# Patient Record
Sex: FEMALE | Race: WHITE
[De-identification: names, ages, dates, MRNs, and addresses within clinical notes are randomized per-mention and may not be internally consistent; named-entity substitution may affect disease eponyms.]

---

## 2017-01-19 ENCOUNTER — HOSPITAL ENCOUNTER (OUTPATIENT)
Dept: HOSPITAL 83 - LAB | Age: 62
Discharge: HOME | End: 2017-01-19
Attending: INTERNAL MEDICINE
Payer: MEDICARE

## 2017-01-19 DIAGNOSIS — D89.9: ICD-10-CM

## 2017-01-19 DIAGNOSIS — D63.1: ICD-10-CM

## 2017-01-19 DIAGNOSIS — E83.40: ICD-10-CM

## 2017-01-19 DIAGNOSIS — I15.1: Primary | ICD-10-CM

## 2017-01-19 DIAGNOSIS — Z94.0: ICD-10-CM

## 2017-01-19 DIAGNOSIS — T86.19: ICD-10-CM

## 2017-01-19 LAB
ALBUMIN SERPL-MCNC: 3.6 GM/DL (ref 3.1–4.5)
ALP SERPL-CCNC: 97 U/L (ref 45–117)
ALT SERPL W P-5'-P-CCNC: 27 U/L (ref 12–78)
AST SERPL-CCNC: 20 IU/L (ref 3–35)
BASOPHILS # BLD AUTO: 0 10*3/UL (ref 0–0.1)
BASOPHILS NFR BLD AUTO: 0.5 % (ref 0–1)
BUN SERPL-MCNC: 19 MG/DL (ref 7–24)
CHLORIDE SERPL-SCNC: 109 MMOL/L (ref 98–107)
CHOLEST SERPL-MCNC: 152 MG/DL (ref ?–200)
CK SERPL-CCNC: 65 U/L (ref 26–192)
CO2 SERPL-SCNC: 29 MMOL/L (ref 21–32)
EOSINOPHIL # BLD AUTO: 0.2 10*3/UL (ref 0–0.4)
EOSINOPHIL # BLD AUTO: 2.7 % (ref 1–4)
ERYTHROCYTE [DISTWIDTH] IN BLOOD BY AUTOMATED COUNT: 12.8 % (ref 0–14.5)
GLUCOSE SERPL-MCNC: 116 MG/DL (ref 65–99)
HCT VFR BLD AUTO: 43.9 % (ref 37–47)
HGB BLD-MCNC: 14.4 G/DL (ref 12–16)
IG #: 0 10*3/UL (ref 0–0.1)
LDH SERPL-CCNC: 151 U/L (ref 84–246)
LYMPHOCYTES # BLD AUTO: 0.9 10*3/UL (ref 1.3–4.4)
LYMPHOCYTES NFR BLD AUTO: 14.2 % (ref 27–41)
MAGNESIUM SERPL-MCNC: 2 MG/DL (ref 1.5–2.1)
MCH RBC QN AUTO: 28.9 PG (ref 27–31)
MCHC RBC AUTO-ENTMCNC: 32.8 G/DL (ref 33–37)
MCV RBC AUTO: 88.2 FL (ref 81–99)
MONOCYTES # BLD AUTO: 0.6 10*3/UL (ref 0.1–1)
MONOCYTES NFR BLD MANUAL: 9.7 % (ref 3–9)
NEUT #: 4.5 10*3/UL (ref 2.3–7.9)
NEUT %: 72.4 % (ref 47–73)
NRBC BLD QL AUTO: 0 10*3/UL (ref 0–0)
PHOSPHATE SERPL-MCNC: 2.2 MG/DL (ref 2.5–4.9)
PLATELET # BLD AUTO: 144 10*3/UL (ref 130–400)
PMV BLD AUTO: 12 FL (ref 9.6–12.3)
POTASSIUM SERPL-SCNC: 4.1 MMOL/L (ref 3.5–5.1)
PROT SERPL-MCNC: 6.4 GM/DL (ref 6.4–8.2)
RBC # BLD AUTO: 4.98 10*6/UL (ref 4.1–5.1)
SODIUM SERPL-SCNC: 145 MMOL/L (ref 136–145)
URATE SERPL-MCNC: 5 MG/DL (ref 2.6–6)
WBC NRBC COR # BLD AUTO: 6.2 10*3/UL (ref 4.8–10.8)

## 2017-01-29 ENCOUNTER — HOSPITAL ENCOUNTER (EMERGENCY)
Dept: HOSPITAL 83 - ED | Age: 62
Discharge: HOME | End: 2017-01-29
Payer: MEDICARE

## 2017-01-29 VITALS — HEIGHT: 55 IN
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
## 2017-01-29 DIAGNOSIS — S01.81XA: Primary | ICD-10-CM

## 2017-01-29 DIAGNOSIS — Z88.6: ICD-10-CM

## 2017-01-29 DIAGNOSIS — S66.912A: ICD-10-CM

## 2017-01-29 DIAGNOSIS — Z88.8: ICD-10-CM

## 2017-01-29 DIAGNOSIS — S80.01XA: ICD-10-CM

## 2017-01-29 DIAGNOSIS — Z79.01: ICD-10-CM

## 2017-01-29 DIAGNOSIS — S66.911A: ICD-10-CM
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
## 2017-01-29 DIAGNOSIS — Z79.4: ICD-10-CM
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
## 2017-01-29 DIAGNOSIS — Z90.49: ICD-10-CM
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
## 2017-01-29 LAB
ALBUMIN SERPL-MCNC: 3.4 GM/DL (ref 3.1–4.5)
ALP SERPL-CCNC: 91 U/L (ref 45–117)
ALT SERPL W P-5'-P-CCNC: 22 U/L (ref 12–78)
AST SERPL-CCNC: 17 IU/L (ref 3–35)
BASOPHILS # BLD AUTO: 0 10*3/UL (ref 0–0.1)
BASOPHILS NFR BLD AUTO: 0.6 % (ref 0–1)
BUN SERPL-MCNC: 18 MG/DL (ref 7–24)
CHLORIDE SERPL-SCNC: 108 MMOL/L (ref 98–107)
CO2 SERPL-SCNC: 25 MMOL/L (ref 21–32)
EOSINOPHIL # BLD AUTO: 0.2 10*3/UL (ref 0–0.4)
EOSINOPHIL # BLD AUTO: 3.3 % (ref 1–4)
ERYTHROCYTE [DISTWIDTH] IN BLOOD BY AUTOMATED COUNT: 12.8 % (ref 0–14.5)
GLUCOSE SERPL-MCNC: 139 MG/DL (ref 65–99)
HCT VFR BLD AUTO: 42.6 % (ref 37–47)
HGB BLD-MCNC: 13.9 G/DL (ref 12–16)
IG #: 0 10*3/UL (ref 0–0.1)
INR BLD: 2.3 (ref 2–3.5)
LYMPHOCYTES # BLD AUTO: 1 10*3/UL (ref 1.3–4.4)
LYMPHOCYTES NFR BLD AUTO: 18.1 % (ref 27–41)
MCH RBC QN AUTO: 28.8 PG (ref 27–31)
MCHC RBC AUTO-ENTMCNC: 32.6 G/DL (ref 33–37)
MCV RBC AUTO: 88.4 FL (ref 81–99)
MONOCYTES # BLD AUTO: 0.7 10*3/UL (ref 0.1–1)
MONOCYTES NFR BLD MANUAL: 12 % (ref 3–9)
NEUT #: 3.5 10*3/UL (ref 2.3–7.9)
NEUT %: 65.6 % (ref 47–73)
NRBC BLD QL AUTO: 0 % (ref 0–0)
PLATELET # BLD AUTO: 143 10*3/UL (ref 130–400)
PMV BLD AUTO: 12.2 FL (ref 9.6–12.3)
POTASSIUM SERPL-SCNC: 4.3 MMOL/L (ref 3.5–5.1)
PROT SERPL-MCNC: 6.2 GM/DL (ref 6.4–8.2)
PROTHROMBIN TIME: 24.8 SECONDS (ref 9–12.4)
RBC # BLD AUTO: 4.82 10*6/UL (ref 4.1–5.1)
SODIUM SERPL-SCNC: 142 MMOL/L (ref 136–145)
WBC NRBC COR # BLD AUTO: 5.4 10*3/UL (ref 4.8–10.8)

## 2017-03-21 ENCOUNTER — HOSPITAL ENCOUNTER (OUTPATIENT)
Dept: HOSPITAL 83 - LAB | Age: 62
Discharge: HOME | End: 2017-03-21
Attending: INTERNAL MEDICINE
Payer: MEDICARE

## 2017-03-21 DIAGNOSIS — D63.1: ICD-10-CM

## 2017-03-21 DIAGNOSIS — E03.9: Primary | ICD-10-CM

## 2017-03-21 DIAGNOSIS — Z94.0: ICD-10-CM

## 2017-03-21 DIAGNOSIS — N25.81: ICD-10-CM

## 2017-03-21 DIAGNOSIS — I51.7: ICD-10-CM

## 2017-03-21 DIAGNOSIS — E83.40: ICD-10-CM

## 2017-03-21 DIAGNOSIS — D89.9: ICD-10-CM

## 2017-03-21 DIAGNOSIS — E78.2: ICD-10-CM

## 2017-03-21 DIAGNOSIS — E55.9: ICD-10-CM

## 2017-03-21 DIAGNOSIS — T86.19: ICD-10-CM

## 2017-03-21 LAB
25(OH)D3 SERPL-MCNC: 46.5 NG/ML (ref 30–100)
ABSOLUTE BASO #: 0 10*3/UL (ref 0–0.1)
ABSOLUTE EOS #: 0.1 10*3/UL (ref 0–0.4)
ABSOLUTE NEUT #: 4.3 10*3/UL (ref 2.3–7.9)
ALBUMIN SERPL-MCNC: 3.4 GM/DL (ref 3.1–4.5)
ALBUMIN SERPL-MCNC: NEGATIVE G/DL
ALBUMIN: 3.4 GM/DL (ref 3.1–4.5)
ALP BLD-CCNC: 106 U/L (ref 45–117)
ALP SERPL-CCNC: 106 U/L (ref 45–117)
ALT SERPL W P-5'-P-CCNC: 30 U/L (ref 12–78)
ALT SERPL-CCNC: 30 U/L (ref 12–78)
APPEARANCE UR: (no result)
AST SERPL-CCNC: 15 IU/L (ref 3–35)
AST SERPL-CCNC: 15 IU/L (ref 3–35)
BACTERIA #/AREA URNS HPF: (no result) /[HPF]
BACTERIA, URINE: ABNORMAL
BASOPHILS # BLD AUTO: 0 10*3/UL (ref 0–0.1)
BASOPHILS %: 0.5 % (ref 0–1)
BASOPHILS NFR BLD AUTO: 0.5 % (ref 0–1)
BILIRUB SERPL-MCNC: 0.5 MG/DL (ref 0.2–1)
BILIRUB UR QL STRIP: NEGATIVE
BILIRUBIN: NEGATIVE
BLOOD: NEGATIVE
BUN BLDV-MCNC: 18 MG/DL (ref 7–24)
BUN SERPL-MCNC: 18 MG/DL (ref 7–24)
CALCIUM SERPL-MCNC: 9.6 MG/DL (ref 8.5–10.5)
CHLORIDE BLD-SCNC: 109 MMOL/L (ref 98–107)
CHLORIDE SERPL-SCNC: 109 MMOL/L (ref 98–107)
CHOLEST SERPL-MCNC: 172 MG/DL (ref ?–200)
CHOLESTEROL: 172 MG/DL
CK SERPL-CCNC: 64 U/L (ref 26–192)
CLARITY: ABNORMAL
CO2 SERPL-SCNC: 25 MMOL/L (ref 21–32)
CO2: 25 MMOL/L (ref 21–32)
COLOR UR: YELLOW
COLOR: YELLOW
CPK: 64 U/L (ref 26–192)
CREAT SERPL-MCNC: 0.85 MG/DL (ref 0.55–1.02)
CREATININE, RANDOM URINE: 143 MG/DL
EOSINOPHIL # BLD AUTO: 0.1 10*3/UL (ref 0–0.4)
EOSINOPHIL # BLD AUTO: 1.4 % (ref 1–4)
EOSINOPHILS %: 1.4 % (ref 1–4)
EPITHELIAL CELLS, UA: ABNORMAL
ERYTHROCYTE [DISTWIDTH] IN BLOOD BY AUTOMATED COUNT: 13.2 % (ref 0–14.5)
GFR AFRICAN AMERICAN: > 60 ML/MIN
GFR SERPL CREATININE-BSD FRML MDRD: >60 ML/MIN/
GLUCOSE SERPL-MCNC: 174 MG/DL (ref 65–99)
GLUCOSE UR QL: (no result)
GLUCOSE: 174 MG/DL (ref 65–99)
GLUCOSE: ABNORMAL
HCT VFR BLD AUTO: 43.8 % (ref 37–47)
HCT VFR BLD CALC: 43.8 % (ref 37–47)
HDLC SERPL-MCNC: 57 MG/DL (ref 40–60)
HDLC SERPL-MCNC: 57 MG/DL (ref 40–60)
HEMOGLOBIN: 14.7 G/DL (ref 12–16)
HGB BLD-MCNC: 14.7 G/DL (ref 12–16)
HGB UR QL STRIP: NEGATIVE
IG #: 0.1 10*3/UL (ref 0–0.1)
IMMATURE GRANULOCYTES #: 0.1 10*3/UL (ref 0–0.1)
IMMATURE GRANULOCYTES: 0.8 % (ref 0–1)
KETONES UR QL STRIP: NEGATIVE
KETONES: NEGATIVE
LACTATE DEHYDROGENASE: 170 U/L (ref 84–246)
LDH SERPL-CCNC: 170 U/L (ref 84–246)
LDL CHOLESTEROL: 89 MG/DL (ref 9–159)
LDLC SERPL DIRECT ASSAY-MCNC: 89 MG/DL (ref 9–159)
LEUKOCYTE ESTERASE UR QL STRIP: NEGATIVE
LEUKOCYTE ESTERASE, URINE: NEGATIVE
LYMPHOCYTE %: 19.3 % (ref 27–41)
LYMPHOCYTES # BLD AUTO: 1.2 10*3/UL (ref 1.3–4.4)
LYMPHOCYTES # BLD: 1.2 10*3/UL (ref 1.3–4.4)
LYMPHOCYTES NFR BLD AUTO: 19.3 % (ref 27–41)
MAGNESIUM SERPL-MCNC: 2.3 MG/DL (ref 1.5–2.1)
MAGNESIUM: 2.3 MG/DL (ref 1.5–2.1)
MCH RBC QN AUTO: 29.2 PG (ref 27–31)
MCH RBC QN AUTO: 29.2 PG (ref 27–31)
MCHC RBC AUTO-ENTMCNC: 33.6 G/DL (ref 33–37)
MCHC RBC AUTO-ENTMCNC: 33.6 G/DL (ref 33–37)
MCV RBC AUTO: 86.9 FL (ref 81–99)
MCV RBC AUTO: 86.9 FL (ref 81–99)
MONOCYTES # BLD AUTO: 0.6 10*3/UL (ref 0.1–1)
MONOCYTES # BLD: 0.6 10*3/UL (ref 0.1–1)
MONOCYTES %: 9.6 % (ref 3–9)
MONOCYTES NFR BLD MANUAL: 9.6 % (ref 3–9)
NEUT #: 4.3 10*3/UL (ref 2.3–7.9)
NEUT %: 68.4 % (ref 47–73)
NEUTROPHILS %: 68.4 % (ref 47–73)
NITRITE UR QL STRIP: NEGATIVE
NITRITE, URINE: NEGATIVE
NRBC BLD QL AUTO: 0 10*3/UL (ref 0–0)
NUCLEATED RED BLOOD CELLS: 0 % (ref 0–0)
PDW BLD-RTO: 13.2 % (ref 0–14.5)
PH UR STRIP: 6 [PH] (ref 5–9)
PH, URINE: 6 (ref 5–9)
PHOSPHATE SERPL-MCNC: 2.1 MG/DL (ref 2.5–4.9)
PHOSPHORUS: 2.1 MG/DL (ref 2.5–4.9)
PLATELET # BLD AUTO: 178 10*3/UL (ref 130–400)
PLATELET # BLD: 178 10*3/UL (ref 130–400)
PMV BLD AUTO: 12.4 FL (ref 9.6–12.3)
PMV BLD AUTO: 12.4 FL (ref 9.6–12.3)
POTASSIUM SERPL-SCNC: 4.2 MMOL/L (ref 3.5–5.1)
POTASSIUM SERPL-SCNC: 4.2 MMOL/L (ref 3.5–5.1)
PROT SERPL-MCNC: 6.1 GM/DL (ref 6.4–8.2)
PROTEIN UA: NEGATIVE
PROTEIN/CREAT RATIO URINE RAN: 0.1
PTH INTACT: 226.4 PG/ML (ref 14–72)
PTH-INTACT SERPL-MCNC: 226.4 PG/ML (ref 14–72)
RBC # BLD AUTO: 5.04 10*6/UL (ref 4.1–5.1)
RBC # BLD: 5.04 10*6/UL (ref 4.1–5.1)
RBC #/AREA URNS HPF: (no result) RBC/HPF (ref 0–2)
RBC UA: ABNORMAL RBC/HPF (ref 0–2)
SODIUM BLD-SCNC: 144 MMOL/L (ref 136–145)
SODIUM SERPL-SCNC: 144 MMOL/L (ref 136–145)
SP GR UR: 1.02 (ref 1–1.03)
SPECIFIC GRAVITY UA: 1.02 (ref 1–1.03)
T4 FREE SERPL-MCNC: 1.07 NG/DL (ref 0.76–1.46)
T4 FREE: 1.07 NG/DL (ref 0.76–1.46)
TOTAL PROTEIN: 6.1 GM/DL (ref 6.4–8.2)
TRIGL SERPL-MCNC: 132 MG/DL
TRIGL SERPL-MCNC: 132 MG/DL (ref ?–150)
TSH SERPL DL<=0.005 MIU/L-ACNC: 1.19 UIU/ML (ref 0.36–4.75)
TSH SERPL DL<=0.05 MIU/L-ACNC: 1.19 UIU/ML (ref 0.36–4.75)
URATE SERPL-MCNC: 4.8 MG/DL (ref 2.6–6)
URIC ACID: 4.8 MG/DL (ref 2.6–6)
URINE TOTAL PROTEIN CREATININE RATIO: 20.6 MG/DL
URINE TP/CRE RATIO: 0.1 (ref ?–0.21)
UROBILINOGEN UR STRIP-MCNC: 1 E.U./DL (ref 0.2–1)
UROBILINOGEN, URINE: 1 E.U./DL (ref 0.2–1)
VITAMIN D 25-HYDROXY: 46.5 NG/ML (ref 30–100)
VLDLC SERPL CALC-MCNC: 26 MG/DL (ref 6–40)
VLDLC SERPL CALC-MCNC: 26 MG/DL (ref 6–40)
WBC # BLD: 6.2 10*3/UL (ref 4.8–10.8)
WBC #/AREA URNS HPF: (no result) WBC/HPF (ref 0–5)
WBC NRBC COR # BLD AUTO: 6.2 10*3/UL (ref 4.8–10.8)
WBC URINE: ABNORMAL WBC/HPF (ref 0–5)

## 2017-04-05 ENCOUNTER — HOSPITAL ENCOUNTER (INPATIENT)
Dept: HOSPITAL 83 - ED | Age: 62
LOS: 2 days | Discharge: ANOTHER HOSPITAL | DRG: 303 | End: 2017-04-07
Attending: INTERNAL MEDICINE | Admitting: INTERNAL MEDICINE
Payer: MEDICARE

## 2017-04-05 VITALS — DIASTOLIC BLOOD PRESSURE: 72 MMHG | SYSTOLIC BLOOD PRESSURE: 134 MMHG

## 2017-04-05 VITALS — WEIGHT: 193.56 LBS | HEIGHT: 66 IN | BODY MASS INDEX: 31.11 KG/M2

## 2017-04-05 VITALS — DIASTOLIC BLOOD PRESSURE: 75 MMHG

## 2017-04-05 VITALS — DIASTOLIC BLOOD PRESSURE: 74 MMHG

## 2017-04-05 VITALS — DIASTOLIC BLOOD PRESSURE: 62 MMHG

## 2017-04-05 DIAGNOSIS — E78.5: ICD-10-CM

## 2017-04-05 DIAGNOSIS — Z80.1: ICD-10-CM

## 2017-04-05 DIAGNOSIS — Z79.899: ICD-10-CM

## 2017-04-05 DIAGNOSIS — I25.2: ICD-10-CM

## 2017-04-05 DIAGNOSIS — I12.9: ICD-10-CM

## 2017-04-05 DIAGNOSIS — Z90.49: ICD-10-CM

## 2017-04-05 DIAGNOSIS — Z79.4: ICD-10-CM

## 2017-04-05 DIAGNOSIS — Z82.49: ICD-10-CM

## 2017-04-05 DIAGNOSIS — E11.22: ICD-10-CM

## 2017-04-05 DIAGNOSIS — Q87.81: ICD-10-CM

## 2017-04-05 DIAGNOSIS — N18.9: ICD-10-CM

## 2017-04-05 DIAGNOSIS — I25.10: Primary | ICD-10-CM

## 2017-04-05 DIAGNOSIS — E03.9: ICD-10-CM

## 2017-04-05 DIAGNOSIS — I82.509: ICD-10-CM

## 2017-04-05 DIAGNOSIS — Z79.84: ICD-10-CM

## 2017-04-05 DIAGNOSIS — Z79.01: ICD-10-CM

## 2017-04-05 DIAGNOSIS — Z94.0: ICD-10-CM

## 2017-04-05 LAB
ABSOLUTE BASO #: 0 10*3/UL (ref 0–0.1)
ABSOLUTE EOS #: 0.1 10*3/UL (ref 0–0.4)
ABSOLUTE NEUT #: 6 10*3/UL (ref 2.3–7.9)
ALBUMIN SERPL-MCNC: 3.7 GM/DL (ref 3.1–4.5)
ALBUMIN: 3.7 GM/DL (ref 3.1–4.5)
ALP BLD-CCNC: 110 U/L (ref 45–117)
ALP SERPL-CCNC: 110 U/L (ref 45–117)
ALT SERPL W P-5'-P-CCNC: 34 U/L (ref 12–78)
ALT SERPL-CCNC: 34 U/L (ref 12–78)
APTT: 33.3 SECONDS (ref 20.8–31.5)
AST SERPL-CCNC: 15 IU/L (ref 3–35)
AST SERPL-CCNC: 15 IU/L (ref 3–35)
BASOPHILS # BLD AUTO: 0 10*3/UL (ref 0–0.1)
BASOPHILS %: 0.4 % (ref 0–1)
BASOPHILS NFR BLD AUTO: 0.4 % (ref 0–1)
BILIRUB SERPL-MCNC: 0.4 MG/DL (ref 0.2–1)
BUN BLDV-MCNC: 16 MG/DL (ref 7–24)
BUN SERPL-MCNC: 16 MG/DL (ref 7–24)
C-REACTIVE PROTEIN: < 0.29 MG/DL (ref 0–0.3)
CALCIUM SERPL-MCNC: 9.8 MG/DL (ref 8.5–10.5)
CHLORIDE BLD-SCNC: 108 MMOL/L (ref 98–107)
CHLORIDE SERPL-SCNC: 108 MMOL/L (ref 98–107)
CK MB SERPL-MCNC: 0.6 NG/ML (ref 0.5–3.6)
CK MB SERPL-MCNC: 0.8 NG/ML (ref 0.5–3.6)
CK MB: 0.6 NG/ML (ref 0.5–3.6)
CK MB: 0.8 NG/ML (ref 0.5–3.6)
CK SERPL-CCNC: 62 U/L (ref 26–192)
CK SERPL-CCNC: 70 U/L (ref 26–192)
CO2 SERPL-SCNC: 26 MMOL/L (ref 21–32)
CO2: 26 MMOL/L (ref 21–32)
CPK: 62 U/L (ref 26–192)
CPK: 70 U/L (ref 26–192)
CREAT SERPL-MCNC: 0.9 MG/DL (ref 0.55–1.02)
CRP SERPL-MCNC: < 0.29 MG/DL (ref 0–0.3)
EOSINOPHIL # BLD AUTO: 0.1 10*3/UL (ref 0–0.4)
EOSINOPHIL # BLD AUTO: 0.6 % (ref 1–4)
EOSINOPHILS %: 0.6 % (ref 1–4)
ERYTHROCYTE [DISTWIDTH] IN BLOOD BY AUTOMATED COUNT: 13.1 % (ref 0–14.5)
GFR AFRICAN AMERICAN: > 60 ML/MIN
GFR SERPL CREATININE-BSD FRML MDRD: >60 ML/MIN/
GLUCOSE SERPL-MCNC: 136 MG/DL (ref 65–99)
GLUCOSE: 136 MG/DL (ref 65–99)
HCT VFR BLD AUTO: 45.3 % (ref 37–47)
HCT VFR BLD CALC: 45.3 % (ref 37–47)
HEMOGLOBIN: 15.1 G/DL (ref 12–16)
HGB BLD-MCNC: 15.1 G/DL (ref 12–16)
IG #: 0 10*3/UL (ref 0–0.1)
IMMATURE GRANULOCYTES #: 0 10*3/UL (ref 0–0.1)
IMMATURE GRANULOCYTES: 0.4 % (ref 0–1)
INR BLD: 2.9 (ref 2–3.5)
INR BLD: 2.9 (ref 2–3.5)
LA>2 REFLEX 2 HR: (no result)
LACTIC ACID: 2.1 MMOL/L (ref 0.4–2)
LIPASE: 138 U/L (ref 73–393)
LYMPHOCYTE %: 12.6 % (ref 27–41)
LYMPHOCYTES # BLD AUTO: 1 10*3/UL (ref 1.3–4.4)
LYMPHOCYTES # BLD: 1 10*3/UL (ref 1.3–4.4)
LYMPHOCYTES NFR BLD AUTO: 12.6 % (ref 27–41)
Lab: 1.7 MMOL/L (ref 0.4–2)
MAGNESIUM SERPL-MCNC: 2 MG/DL (ref 1.5–2.1)
MAGNESIUM: 2 MG/DL (ref 1.5–2.1)
MCH RBC QN AUTO: 29.3 PG (ref 27–31)
MCH RBC QN AUTO: 29.3 PG (ref 27–31)
MCHC RBC AUTO-ENTMCNC: 33.3 G/DL (ref 33–37)
MCHC RBC AUTO-ENTMCNC: 33.3 G/DL (ref 33–37)
MCV RBC AUTO: 88 FL (ref 81–99)
MCV RBC AUTO: 88 FL (ref 81–99)
MONOCYTES # BLD AUTO: 0.7 10*3/UL (ref 0.1–1)
MONOCYTES # BLD: 0.7 10*3/UL (ref 0.1–1)
MONOCYTES %: 9.3 % (ref 3–9)
MONOCYTES NFR BLD MANUAL: 9.3 % (ref 3–9)
MYOGLOBIN SERPL-MCNC: 72 NG/ML (ref 13–71)
MYOGLOBIN: 72 NG/ML (ref 13–71)
NEUT #: 6 10*3/UL (ref 2.3–7.9)
NEUT %: 76.7 % (ref 47–73)
NEUTROPHILS %: 76.7 % (ref 47–73)
NRBC BLD QL AUTO: 0 10*3/UL (ref 0–0)
NUCLEATED RED BLOOD CELLS: 0 % (ref 0–0)
PDW BLD-RTO: 13.1 % (ref 0–14.5)
PLATELET # BLD AUTO: 170 10*3/UL (ref 130–400)
PLATELET # BLD: 170 10*3/UL (ref 130–400)
PMV BLD AUTO: 11.8 FL (ref 9.6–12.3)
PMV BLD AUTO: 11.8 FL (ref 9.6–12.3)
POTASSIUM SERPL-SCNC: 4.1 MMOL/L (ref 3.5–5.1)
POTASSIUM SERPL-SCNC: 4.1 MMOL/L (ref 3.5–5.1)
PROT SERPL-MCNC: 6.7 GM/DL (ref 6.4–8.2)
PROTHROMBIN TIME: 32.3 SECONDS (ref 9–12.4)
PROTHROMBIN TIME: 32.3 SECONDS (ref 9–12.4)
RBC # BLD AUTO: 5.15 10*6/UL (ref 4.1–5.1)
RBC # BLD: 5.15 10*6/UL (ref 4.1–5.1)
SODIUM BLD-SCNC: 143 MMOL/L (ref 136–145)
SODIUM SERPL-SCNC: 143 MMOL/L (ref 136–145)
TOTAL PROTEIN: 6.7 GM/DL (ref 6.4–8.2)
TROPONIN I SERPL-MCNC: < 0.015 NG/ML (ref ?–0.04)
TROPONIN I SERPL-MCNC: < 0.015 NG/ML (ref ?–0.04)
TROPONIN I: < 0.015 NG/ML
TROPONIN I: < 0.015 NG/ML
WBC # BLD: 7.9 10*3/UL (ref 4.8–10.8)
WBC NRBC COR # BLD AUTO: 7.9 10*3/UL (ref 4.8–10.8)

## 2017-04-06 VITALS — SYSTOLIC BLOOD PRESSURE: 110 MMHG | DIASTOLIC BLOOD PRESSURE: 65 MMHG

## 2017-04-06 VITALS — DIASTOLIC BLOOD PRESSURE: 60 MMHG

## 2017-04-06 VITALS — DIASTOLIC BLOOD PRESSURE: 75 MMHG | SYSTOLIC BLOOD PRESSURE: 155 MMHG

## 2017-04-06 VITALS — DIASTOLIC BLOOD PRESSURE: 68 MMHG

## 2017-04-06 VITALS — DIASTOLIC BLOOD PRESSURE: 58 MMHG

## 2017-04-06 LAB
ABSOLUTE BASO #: 0 10*3/UL (ref 0–0.1)
ABSOLUTE EOS #: 0.1 10*3/UL (ref 0–0.4)
ABSOLUTE NEUT #: 4.5 10*3/UL (ref 2.3–7.9)
APTT: 35.9 SECONDS (ref 20.8–31.5)
BASOPHILS # BLD AUTO: 0 10*3/UL (ref 0–0.1)
BASOPHILS %: 0.3 % (ref 0–1)
BASOPHILS NFR BLD AUTO: 0.3 % (ref 0–1)
BUN BLDV-MCNC: 16 MG/DL (ref 7–24)
BUN SERPL-MCNC: 16 MG/DL (ref 7–24)
CALCIUM SERPL-MCNC: 9.7 MG/DL (ref 8.5–10.5)
CHLORIDE BLD-SCNC: 107 MMOL/L (ref 98–107)
CHLORIDE SERPL-SCNC: 107 MMOL/L (ref 98–107)
CHOLEST SERPL-MCNC: 161 MG/DL (ref ?–200)
CHOLESTEROL: 161 MG/DL
CK MB SERPL-MCNC: 0.6 NG/ML (ref 0.5–3.6)
CK MB SERPL-MCNC: 0.7 NG/ML (ref 0.5–3.6)
CK MB: 0.6 NG/ML (ref 0.5–3.6)
CK MB: 0.7 NG/ML (ref 0.5–3.6)
CK SERPL-CCNC: 55 U/L (ref 26–192)
CK SERPL-CCNC: 56 U/L (ref 26–192)
CO2 SERPL-SCNC: 26 MMOL/L (ref 21–32)
CO2: 26 MMOL/L (ref 21–32)
CPK: 55 U/L (ref 26–192)
CPK: 56 U/L (ref 26–192)
CREAT SERPL-MCNC: 0.8 MG/DL (ref 0.55–1.02)
EOSINOPHIL # BLD AUTO: 0.1 10*3/UL (ref 0–0.4)
EOSINOPHIL # BLD AUTO: 1.4 % (ref 1–4)
EOSINOPHILS %: 1.4 % (ref 1–4)
ERYTHROCYTE [DISTWIDTH] IN BLOOD BY AUTOMATED COUNT: 13.2 % (ref 0–14.5)
EST. AVERAGE GLUCOSE BLD GHB EST-MCNC: 163 MG/DL
ESTIMATED AVERAGE GLUCOSE: 163
GFR AFRICAN AMERICAN: > 60 ML/MIN
GFR SERPL CREATININE-BSD FRML MDRD: >60 ML/MIN/
GLUCOSE SERPL-MCNC: 176 MG/DL (ref 65–99)
GLUCOSE: 176 MG/DL (ref 65–99)
HBA1C MFR BLD: 7.3 % (ref 4.8–5.6)
HCT VFR BLD AUTO: 41.5 % (ref 37–47)
HCT VFR BLD CALC: 41.5 % (ref 37–47)
HDLC SERPL-MCNC: 58 MG/DL (ref 40–60)
HDLC SERPL-MCNC: 58 MG/DL (ref 40–60)
HEMOGLOBIN: 13.9 G/DL (ref 12–16)
HGB BLD-MCNC: 13.9 G/DL (ref 12–16)
IG #: 0 10*3/UL (ref 0–0.1)
IMMATURE GRANULOCYTES #: 0 10*3/UL (ref 0–0.1)
IMMATURE GRANULOCYTES: 0.5 % (ref 0–1)
INR BLD: 2.8 (ref 2–3.5)
INR BLD: 2.8 (ref 2–3.5)
LDL CHOLESTEROL: 78 MG/DL (ref 9–159)
LDLC SERPL DIRECT ASSAY-MCNC: 78 MG/DL (ref 9–159)
LV EF: 90 %
LVEF MODALITY: NORMAL
LYMPHOCYTE %: 14.2 % (ref 27–41)
LYMPHOCYTES # BLD AUTO: 0.9 10*3/UL (ref 1.3–4.4)
LYMPHOCYTES # BLD: 0.9 10*3/UL (ref 1.3–4.4)
LYMPHOCYTES NFR BLD AUTO: 14.2 % (ref 27–41)
MAGNESIUM SERPL-MCNC: 1.9 MG/DL (ref 1.5–2.1)
MAGNESIUM: 1.9 MG/DL (ref 1.5–2.1)
MCH RBC QN AUTO: 29.6 PG (ref 27–31)
MCH RBC QN AUTO: 29.6 PG (ref 27–31)
MCHC RBC AUTO-ENTMCNC: 33.5 G/DL (ref 33–37)
MCHC RBC AUTO-ENTMCNC: 33.5 G/DL (ref 33–37)
MCV RBC AUTO: 88.5 FL (ref 81–99)
MCV RBC AUTO: 88.5 FL (ref 81–99)
MONOCYTES # BLD AUTO: 0.8 10*3/UL (ref 0.1–1)
MONOCYTES # BLD: 0.8 10*3/UL (ref 0.1–1)
MONOCYTES %: 12.3 % (ref 3–9)
MONOCYTES NFR BLD MANUAL: 12.3 % (ref 3–9)
NEUT #: 4.5 10*3/UL (ref 2.3–7.9)
NEUT %: 71.3 % (ref 47–73)
NEUTROPHILS %: 71.3 % (ref 47–73)
NRBC BLD QL AUTO: 0 % (ref 0–0)
NUCLEATED RED BLOOD CELLS: 0 % (ref 0–0)
PDW BLD-RTO: 13.2 % (ref 0–14.5)
PHOSPHATE SERPL-MCNC: 2.5 MG/DL (ref 2.5–4.9)
PHOSPHORUS: 2.5 MG/DL (ref 2.5–4.9)
PLATELET # BLD AUTO: 154 10*3/UL (ref 130–400)
PLATELET # BLD: 154 10*3/UL (ref 130–400)
PMV BLD AUTO: 12 FL (ref 9.6–12.3)
PMV BLD AUTO: 12 FL (ref 9.6–12.3)
POTASSIUM SERPL-SCNC: 3.9 MMOL/L (ref 3.5–5.1)
POTASSIUM SERPL-SCNC: 3.9 MMOL/L (ref 3.5–5.1)
PROTHROMBIN TIME: 31.5 SECONDS (ref 9–12.4)
PROTHROMBIN TIME: 31.5 SECONDS (ref 9–12.4)
RBC # BLD AUTO: 4.69 10*6/UL (ref 4.1–5.1)
RBC # BLD: 4.69 10*6/UL (ref 4.1–5.1)
SODIUM BLD-SCNC: 142 MMOL/L (ref 136–145)
SODIUM SERPL-SCNC: 142 MMOL/L (ref 136–145)
T4 FREE SERPL-MCNC: 1.04 NG/DL (ref 0.76–1.46)
T4 FREE: 1.04 NG/DL (ref 0.76–1.46)
TRIGL SERPL-MCNC: 127 MG/DL
TRIGL SERPL-MCNC: 127 MG/DL (ref ?–150)
TROPONIN I SERPL-MCNC: < 0.015 NG/ML (ref ?–0.04)
TROPONIN I SERPL-MCNC: < 0.015 NG/ML (ref ?–0.04)
TROPONIN I: < 0.015 NG/ML
TROPONIN I: < 0.015 NG/ML
TSH SERPL DL<=0.005 MIU/L-ACNC: 1.74 UIU/ML (ref 0.36–4.75)
TSH SERPL DL<=0.05 MIU/L-ACNC: 1.74 UIU/ML (ref 0.36–4.75)
VLDLC SERPL CALC-MCNC: 25 MG/DL (ref 6–40)
VLDLC SERPL CALC-MCNC: 25 MG/DL (ref 6–40)
WBC # BLD: 6.3 10*3/UL (ref 4.8–10.8)
WBC NRBC COR # BLD AUTO: 6.3 10*3/UL (ref 4.8–10.8)

## 2017-04-06 PROCEDURE — 4A02XM4 MEASUREMENT OF CARDIAC TOTAL ACTIVITY, EXTERNAL APPROACH: ICD-10-PCS | Performed by: INTERNAL MEDICINE

## 2017-04-07 VITALS — DIASTOLIC BLOOD PRESSURE: 71 MMHG

## 2017-04-07 VITALS — DIASTOLIC BLOOD PRESSURE: 92 MMHG | SYSTOLIC BLOOD PRESSURE: 148 MMHG

## 2017-04-07 VITALS — DIASTOLIC BLOOD PRESSURE: 66 MMHG

## 2017-04-07 LAB — TROPONIN I: 0.04 NG/ML

## 2017-05-09 ENCOUNTER — HOSPITAL ENCOUNTER (OUTPATIENT)
Dept: HOSPITAL 83 - LAB | Age: 62
Discharge: HOME | End: 2017-05-09
Attending: INTERNAL MEDICINE
Payer: MEDICARE

## 2017-05-09 DIAGNOSIS — Z94.0: ICD-10-CM

## 2017-05-09 DIAGNOSIS — T86.19: ICD-10-CM

## 2017-05-09 DIAGNOSIS — D89.9: ICD-10-CM

## 2017-05-09 DIAGNOSIS — D63.1: ICD-10-CM

## 2017-05-09 DIAGNOSIS — I15.1: Primary | ICD-10-CM

## 2017-05-09 DIAGNOSIS — E83.40: ICD-10-CM

## 2017-05-09 LAB
ABSOLUTE BASO #: 0.1 10*3/UL (ref 0–0.1)
ABSOLUTE EOS #: 0.3 10*3/UL (ref 0–0.4)
ABSOLUTE NEUT #: 6.4 10*3/UL (ref 2.3–7.9)
ALBUMIN SERPL-MCNC: 3.3 GM/DL (ref 3.1–4.5)
ALBUMIN: 3.3 GM/DL (ref 3.1–4.5)
ALP BLD-CCNC: 142 U/L (ref 45–117)
ALP SERPL-CCNC: 142 U/L (ref 45–117)
ALT SERPL W P-5'-P-CCNC: 21 U/L (ref 12–78)
ALT SERPL-CCNC: 21 U/L (ref 12–78)
AST SERPL-CCNC: 18 IU/L (ref 3–35)
AST SERPL-CCNC: 18 IU/L (ref 3–35)
BASOPHILS # BLD AUTO: 0.1 10*3/UL (ref 0–0.1)
BASOPHILS %: 0.6 % (ref 0–1)
BASOPHILS NFR BLD AUTO: 0.6 % (ref 0–1)
BILIRUB SERPL-MCNC: 0.3 MG/DL (ref 0.2–1)
BUN BLDV-MCNC: 21 MG/DL (ref 7–24)
BUN SERPL-MCNC: 21 MG/DL (ref 7–24)
CALCIUM SERPL-MCNC: 10.2 MG/DL (ref 8.5–10.5)
CHLORIDE BLD-SCNC: 105 MMOL/L (ref 98–107)
CHLORIDE SERPL-SCNC: 105 MMOL/L (ref 98–107)
CHOLEST SERPL-MCNC: 171 MG/DL (ref ?–200)
CHOLESTEROL: 171 MG/DL
CK SERPL-CCNC: 51 U/L (ref 26–192)
CO2 SERPL-SCNC: 29 MMOL/L (ref 21–32)
CO2: 29 MMOL/L (ref 21–32)
CPK: 51 U/L (ref 26–192)
CREAT SERPL-MCNC: 0.87 MG/DL (ref 0.55–1.02)
EOSINOPHIL # BLD AUTO: 0.3 10*3/UL (ref 0–0.4)
EOSINOPHIL # BLD AUTO: 3.8 % (ref 1–4)
EOSINOPHILS %: 3.8 % (ref 1–4)
ERYTHROCYTE [DISTWIDTH] IN BLOOD BY AUTOMATED COUNT: 13.7 % (ref 0–14.5)
GFR AFRICAN AMERICAN: > 60 ML/MIN
GFR SERPL CREATININE-BSD FRML MDRD: >60 ML/MIN/
GLUCOSE SERPL-MCNC: 152 MG/DL (ref 65–99)
GLUCOSE: 152 MG/DL (ref 65–99)
HCT VFR BLD AUTO: 39.7 % (ref 37–47)
HCT VFR BLD CALC: 39.7 % (ref 37–47)
HEMOGLOBIN: 12.7 G/DL (ref 12–16)
HGB BLD-MCNC: 12.7 G/DL (ref 12–16)
IG #: 0 10*3/UL (ref 0–0.1)
IMMATURE GRANULOCYTES #: 0 10*3/UL (ref 0–0.1)
IMMATURE GRANULOCYTES: 0.2 % (ref 0–1)
LACTATE DEHYDROGENASE: 174 U/L (ref 84–246)
LDH SERPL-CCNC: 174 U/L (ref 84–246)
LYMPHOCYTE %: 11.5 % (ref 27–41)
LYMPHOCYTES # BLD AUTO: 1 10*3/UL (ref 1.3–4.4)
LYMPHOCYTES # BLD: 1 10*3/UL (ref 1.3–4.4)
LYMPHOCYTES NFR BLD AUTO: 11.5 % (ref 27–41)
MAGNESIUM SERPL-MCNC: 1.8 MG/DL (ref 1.5–2.1)
MAGNESIUM: 1.8 MG/DL (ref 1.5–2.1)
MCH RBC QN AUTO: 28.9 PG (ref 27–31)
MCH RBC QN AUTO: 28.9 PG (ref 27–31)
MCHC RBC AUTO-ENTMCNC: 32 G/DL (ref 33–37)
MCHC RBC AUTO-ENTMCNC: 32 G/DL (ref 33–37)
MCV RBC AUTO: 90.4 FL (ref 81–99)
MCV RBC AUTO: 90.4 FL (ref 81–99)
MONOCYTES # BLD AUTO: 0.9 10*3/UL (ref 0.1–1)
MONOCYTES # BLD: 0.9 10*3/UL (ref 0.1–1)
MONOCYTES %: 9.8 % (ref 3–9)
MONOCYTES NFR BLD MANUAL: 9.8 % (ref 3–9)
NEUT #: 6.4 10*3/UL (ref 2.3–7.9)
NEUT %: 74.1 % (ref 47–73)
NEUTROPHILS %: 74.1 % (ref 47–73)
NRBC BLD QL AUTO: 0 10*3/UL (ref 0–0)
NUCLEATED RED BLOOD CELLS: 0 % (ref 0–0)
PDW BLD-RTO: 13.7 % (ref 0–14.5)
PHOSPHATE SERPL-MCNC: 2.6 MG/DL (ref 2.5–4.9)
PHOSPHORUS: 2.6 MG/DL (ref 2.5–4.9)
PLATELET # BLD AUTO: 262 10*3/UL (ref 130–400)
PLATELET # BLD: 262 10*3/UL (ref 130–400)
PMV BLD AUTO: 12 FL (ref 9.6–12.3)
PMV BLD AUTO: 12 FL (ref 9.6–12.3)
POTASSIUM SERPL-SCNC: 4.5 MMOL/L (ref 3.5–5.1)
POTASSIUM SERPL-SCNC: 4.5 MMOL/L (ref 3.5–5.1)
PROT SERPL-MCNC: 6.7 GM/DL (ref 6.4–8.2)
RBC # BLD AUTO: 4.39 10*6/UL (ref 4.1–5.1)
RBC # BLD: 4.39 10*6/UL (ref 4.1–5.1)
SODIUM BLD-SCNC: 140 MMOL/L (ref 136–145)
SODIUM SERPL-SCNC: 140 MMOL/L (ref 136–145)
TOTAL PROTEIN: 6.7 GM/DL (ref 6.4–8.2)
URATE SERPL-MCNC: 5.6 MG/DL (ref 2.6–6)
URIC ACID: 5.6 MG/DL (ref 2.6–6)
WBC # BLD: 8.7 10*3/UL (ref 4.8–10.8)
WBC NRBC COR # BLD AUTO: 8.7 10*3/UL (ref 4.8–10.8)

## 2017-06-26 ENCOUNTER — HOSPITAL ENCOUNTER (OUTPATIENT)
Dept: HOSPITAL 83 - LAB | Age: 62
Discharge: HOME | End: 2017-06-26
Attending: INTERNAL MEDICINE
Payer: MEDICARE

## 2017-06-26 DIAGNOSIS — N18.9: ICD-10-CM

## 2017-06-26 DIAGNOSIS — E83.40: Primary | ICD-10-CM

## 2017-06-26 DIAGNOSIS — D89.9: ICD-10-CM

## 2017-06-26 DIAGNOSIS — T86.19: ICD-10-CM

## 2017-06-26 DIAGNOSIS — I15.1: ICD-10-CM

## 2017-06-26 DIAGNOSIS — D63.1: ICD-10-CM

## 2017-06-26 DIAGNOSIS — Z94.0: ICD-10-CM

## 2017-06-26 LAB
ABSOLUTE BASO #: 0.1 10*3/UL (ref 0–0.1)
ABSOLUTE EOS #: 0.2 10*3/UL (ref 0–0.4)
ABSOLUTE NEUT #: 5.4 10*3/UL (ref 2.3–7.9)
ALBUMIN SERPL-MCNC: 3.6 GM/DL (ref 3.1–4.5)
ALBUMIN: 3.6 GM/DL (ref 3.1–4.5)
ALP BLD-CCNC: 94 U/L (ref 45–117)
ALP SERPL-CCNC: 94 U/L (ref 45–117)
ALT SERPL W P-5'-P-CCNC: 20 U/L (ref 12–78)
ALT SERPL-CCNC: 20 U/L (ref 12–78)
AST SERPL-CCNC: 18 IU/L (ref 3–35)
AST SERPL-CCNC: 18 IU/L (ref 3–35)
BASOPHILS # BLD AUTO: 0.1 10*3/UL (ref 0–0.1)
BASOPHILS %: 0.7 % (ref 0–1)
BASOPHILS NFR BLD AUTO: 0.7 % (ref 0–1)
BILIRUB SERPL-MCNC: 0.3 MG/DL (ref 0.2–1)
BUN BLDV-MCNC: 19 MG/DL (ref 7–24)
BUN SERPL-MCNC: 19 MG/DL (ref 7–24)
CALCIUM SERPL-MCNC: 10 MG/DL (ref 8.5–10.5)
CHLORIDE BLD-SCNC: 106 MMOL/L (ref 98–107)
CHLORIDE SERPL-SCNC: 106 MMOL/L (ref 98–107)
CHOLEST SERPL-MCNC: 163 MG/DL (ref ?–200)
CHOLESTEROL: 163 MG/DL
CK SERPL-CCNC: 84 U/L (ref 26–192)
CO2 SERPL-SCNC: 30 MMOL/L (ref 21–32)
CO2: 30 MMOL/L (ref 21–32)
CPK: 84 U/L (ref 26–192)
CREAT SERPL-MCNC: 0.99 MG/DL (ref 0.55–1.02)
EOSINOPHIL # BLD AUTO: 0.2 10*3/UL (ref 0–0.4)
EOSINOPHIL # BLD AUTO: 2.7 % (ref 1–4)
EOSINOPHILS %: 2.7 % (ref 1–4)
ERYTHROCYTE [DISTWIDTH] IN BLOOD BY AUTOMATED COUNT: 13.2 % (ref 0–14.5)
GFR AFRICAN AMERICAN: > 60 ML/MIN
GFR SERPL CREATININE-BSD FRML MDRD: 57 ML/MIN/
GLUCOSE SERPL-MCNC: 108 MG/DL (ref 65–99)
GLUCOSE: 108 MG/DL (ref 65–99)
HCT VFR BLD AUTO: 42.3 % (ref 37–47)
HCT VFR BLD CALC: 42.3 % (ref 37–47)
HEMOGLOBIN: 13.5 G/DL (ref 12–16)
HGB BLD-MCNC: 13.5 G/DL (ref 12–16)
IG #: 0 10*3/UL (ref 0–0.1)
IMMATURE GRANULOCYTES #: 0 10*3/UL (ref 0–0.1)
IMMATURE GRANULOCYTES: 0.4 % (ref 0–1)
LACTATE DEHYDROGENASE: 167 U/L (ref 84–246)
LDH SERPL-CCNC: 167 U/L (ref 84–246)
LYMPHOCYTE %: 13.8 % (ref 27–41)
LYMPHOCYTES # BLD AUTO: 1 10*3/UL (ref 1.3–4.4)
LYMPHOCYTES # BLD: 1 10*3/UL (ref 1.3–4.4)
LYMPHOCYTES NFR BLD AUTO: 13.8 % (ref 27–41)
MAGNESIUM SERPL-MCNC: 1.8 MG/DL (ref 1.5–2.1)
MAGNESIUM: 1.8 MG/DL (ref 1.5–2.1)
MCH RBC QN AUTO: 28.5 PG (ref 27–31)
MCH RBC QN AUTO: 28.5 PG (ref 27–31)
MCHC RBC AUTO-ENTMCNC: 31.9 G/DL (ref 33–37)
MCHC RBC AUTO-ENTMCNC: 31.9 G/DL (ref 33–37)
MCV RBC AUTO: 89.4 FL (ref 81–99)
MCV RBC AUTO: 89.4 FL (ref 81–99)
MONOCYTES # BLD AUTO: 0.6 10*3/UL (ref 0.1–1)
MONOCYTES # BLD: 0.6 10*3/UL (ref 0.1–1)
MONOCYTES %: 8.8 % (ref 3–9)
MONOCYTES NFR BLD MANUAL: 8.8 % (ref 3–9)
NEUT #: 5.4 10*3/UL (ref 2.3–7.9)
NEUT %: 73.6 % (ref 47–73)
NEUTROPHILS %: 73.6 % (ref 47–73)
NRBC BLD QL AUTO: 0 % (ref 0–0)
NUCLEATED RED BLOOD CELLS: 0 % (ref 0–0)
PDW BLD-RTO: 13.2 % (ref 0–14.5)
PHOSPHATE SERPL-MCNC: 3.1 MG/DL (ref 2.5–4.9)
PHOSPHORUS: 3.1 MG/DL (ref 2.5–4.9)
PLATELET # BLD AUTO: 164 10*3/UL (ref 130–400)
PLATELET # BLD: 164 10*3/UL (ref 130–400)
PMV BLD AUTO: 12.4 FL (ref 9.6–12.3)
PMV BLD AUTO: 12.4 FL (ref 9.6–12.3)
POTASSIUM SERPL-SCNC: 4.1 MMOL/L (ref 3.5–5.1)
POTASSIUM SERPL-SCNC: 4.1 MMOL/L (ref 3.5–5.1)
PROT SERPL-MCNC: 6.3 GM/DL (ref 6.4–8.2)
RBC # BLD AUTO: 4.73 10*6/UL (ref 4.1–5.1)
RBC # BLD: 4.73 10*6/UL (ref 4.1–5.1)
SODIUM BLD-SCNC: 144 MMOL/L (ref 136–145)
SODIUM SERPL-SCNC: 144 MMOL/L (ref 136–145)
TOTAL PROTEIN: 6.3 GM/DL (ref 6.4–8.2)
URATE SERPL-MCNC: 5.4 MG/DL (ref 2.6–6)
URIC ACID: 5.4 MG/DL (ref 2.6–6)
WBC # BLD: 7.3 10*3/UL (ref 4.8–10.8)
WBC NRBC COR # BLD AUTO: 7.3 10*3/UL (ref 4.8–10.8)

## 2017-06-28 LAB — TACROLIMUS BLOOD: 6.3 NG/ML (ref 2–20)

## 2017-07-19 ENCOUNTER — HOSPITAL ENCOUNTER (OUTPATIENT)
Dept: HOSPITAL 83 - LAB | Age: 62
Discharge: HOME | End: 2017-07-19
Attending: INTERNAL MEDICINE
Payer: MEDICARE

## 2017-07-19 DIAGNOSIS — D89.9: ICD-10-CM

## 2017-07-19 DIAGNOSIS — Z94.0: ICD-10-CM

## 2017-07-19 DIAGNOSIS — D63.1: ICD-10-CM

## 2017-07-19 DIAGNOSIS — I15.1: Primary | ICD-10-CM

## 2017-07-19 DIAGNOSIS — E83.40: ICD-10-CM

## 2017-07-19 DIAGNOSIS — T86.19: ICD-10-CM

## 2017-07-19 LAB
ABSOLUTE BASO #: 0.1 10*3/UL (ref 0–0.1)
ABSOLUTE EOS #: 0.2 10*3/UL (ref 0–0.4)
ABSOLUTE NEUT #: 3.9 10*3/UL (ref 2.3–7.9)
ALBUMIN SERPL-MCNC: 3.2 GM/DL (ref 3.1–4.5)
ALBUMIN: 3.2 GM/DL (ref 3.1–4.5)
ALP BLD-CCNC: 98 U/L (ref 45–117)
ALP SERPL-CCNC: 98 U/L (ref 45–117)
ALT SERPL W P-5'-P-CCNC: 23 U/L (ref 12–78)
ALT SERPL-CCNC: 23 U/L (ref 12–78)
AST SERPL-CCNC: 18 IU/L (ref 3–35)
AST SERPL-CCNC: 18 IU/L (ref 3–35)
BASOPHILS # BLD AUTO: 0.1 10*3/UL (ref 0–0.1)
BASOPHILS %: 0.9 % (ref 0–1)
BASOPHILS NFR BLD AUTO: 0.9 % (ref 0–1)
BILIRUB SERPL-MCNC: 0.3 MG/DL (ref 0.2–1)
BUN BLDV-MCNC: 26 MG/DL (ref 7–24)
BUN SERPL-MCNC: 26 MG/DL (ref 7–24)
CALCIUM SERPL-MCNC: 9.8 MG/DL (ref 8.5–10.5)
CHLORIDE BLD-SCNC: 110 MMOL/L (ref 98–107)
CHLORIDE SERPL-SCNC: 110 MMOL/L (ref 98–107)
CHOLEST SERPL-MCNC: 153 MG/DL (ref ?–200)
CHOLESTEROL: 153 MG/DL
CK SERPL-CCNC: 82 U/L (ref 26–192)
CO2 SERPL-SCNC: 27 MMOL/L (ref 21–32)
CO2: 27 MMOL/L (ref 21–32)
CPK: 82 U/L (ref 26–192)
CREAT SERPL-MCNC: 1.04 MG/DL (ref 0.55–1.02)
EOSINOPHIL # BLD AUTO: 0.2 10*3/UL (ref 0–0.4)
EOSINOPHIL # BLD AUTO: 3.9 % (ref 1–4)
EOSINOPHILS %: 3.9 % (ref 1–4)
ERYTHROCYTE [DISTWIDTH] IN BLOOD BY AUTOMATED COUNT: 13.9 % (ref 0–14.5)
GFR AFRICAN AMERICAN: > 60 ML/MIN
GFR SERPL CREATININE-BSD FRML MDRD: 54 ML/MIN/
GLUCOSE SERPL-MCNC: 112 MG/DL (ref 65–99)
GLUCOSE: 112 MG/DL (ref 65–99)
HCT VFR BLD AUTO: 40.8 % (ref 37–47)
HCT VFR BLD CALC: 40.8 % (ref 37–47)
HEMOGLOBIN: 12.8 G/DL (ref 12–16)
HGB BLD-MCNC: 12.8 G/DL (ref 12–16)
IG #: 0 10*3/UL (ref 0–0.1)
IMMATURE GRANULOCYTES #: 0 10*3/UL (ref 0–0.1)
IMMATURE GRANULOCYTES: 0.3 % (ref 0–1)
LACTATE DEHYDROGENASE: 203 U/L (ref 84–246)
LDH SERPL-CCNC: 203 U/L (ref 84–246)
LYMPHOCYTE %: 18.2 % (ref 27–41)
LYMPHOCYTES # BLD AUTO: 1.1 10*3/UL (ref 1.3–4.4)
LYMPHOCYTES # BLD: 1.1 10*3/UL (ref 1.3–4.4)
LYMPHOCYTES NFR BLD AUTO: 18.2 % (ref 27–41)
MAGNESIUM SERPL-MCNC: 1.8 MG/DL (ref 1.5–2.1)
MAGNESIUM: 1.8 MG/DL (ref 1.5–2.1)
MCH RBC QN AUTO: 27.8 PG (ref 27–31)
MCH RBC QN AUTO: 27.8 PG (ref 27–31)
MCHC RBC AUTO-ENTMCNC: 31.4 G/DL (ref 33–37)
MCHC RBC AUTO-ENTMCNC: 31.4 G/DL (ref 33–37)
MCV RBC AUTO: 88.7 FL (ref 81–99)
MCV RBC AUTO: 88.7 FL (ref 81–99)
MONOCYTES # BLD AUTO: 0.6 10*3/UL (ref 0.1–1)
MONOCYTES # BLD: 0.6 10*3/UL (ref 0.1–1)
MONOCYTES %: 10.9 % (ref 3–9)
MONOCYTES NFR BLD MANUAL: 10.9 % (ref 3–9)
NEUT #: 3.9 10*3/UL (ref 2.3–7.9)
NEUT %: 65.8 % (ref 47–73)
NEUTROPHILS %: 65.8 % (ref 47–73)
NRBC BLD QL AUTO: 0 % (ref 0–0)
NUCLEATED RED BLOOD CELLS: 0 % (ref 0–0)
PDW BLD-RTO: 13.9 % (ref 0–14.5)
PHOSPHATE SERPL-MCNC: 2.7 MG/DL (ref 2.5–4.9)
PHOSPHORUS: 2.7 MG/DL (ref 2.5–4.9)
PLATELET # BLD AUTO: 146 10*3/UL (ref 130–400)
PLATELET # BLD: 146 10*3/UL (ref 130–400)
PMV BLD AUTO: 13.2 FL (ref 9.6–12.3)
PMV BLD AUTO: 13.2 FL (ref 9.6–12.3)
POTASSIUM SERPL-SCNC: 4.4 MMOL/L (ref 3.5–5.1)
POTASSIUM SERPL-SCNC: 4.4 MMOL/L (ref 3.5–5.1)
PROT SERPL-MCNC: 6.4 GM/DL (ref 6.4–8.2)
RBC # BLD AUTO: 4.6 10*6/UL (ref 4.1–5.1)
RBC # BLD: 4.6 10*6/UL (ref 4.1–5.1)
SODIUM BLD-SCNC: 145 MMOL/L (ref 136–145)
SODIUM SERPL-SCNC: 145 MMOL/L (ref 136–145)
TOTAL PROTEIN: 6.4 GM/DL (ref 6.4–8.2)
URATE SERPL-MCNC: 5.4 MG/DL (ref 2.6–6)
URIC ACID: 5.4 MG/DL (ref 2.6–6)
WBC # BLD: 5.9 10*3/UL (ref 4.8–10.8)
WBC NRBC COR # BLD AUTO: 5.9 10*3/UL (ref 4.8–10.8)

## 2017-07-24 LAB — TACROLIMUS BLOOD: 5.9 NG/ML (ref 2–20)

## 2019-03-06 ENCOUNTER — HOSPITAL ENCOUNTER (INPATIENT)
Dept: HOSPITAL 83 - ED | Age: 64
LOS: 5 days | Discharge: HOME | DRG: 193 | End: 2019-03-11
Attending: INTERNAL MEDICINE | Admitting: INTERNAL MEDICINE
Payer: COMMERCIAL

## 2019-03-06 VITALS — WEIGHT: 214.31 LBS | BODY MASS INDEX: 35.71 KG/M2 | HEIGHT: 65 IN

## 2019-03-06 VITALS — DIASTOLIC BLOOD PRESSURE: 60 MMHG

## 2019-03-06 VITALS — SYSTOLIC BLOOD PRESSURE: 148 MMHG | DIASTOLIC BLOOD PRESSURE: 62 MMHG

## 2019-03-06 VITALS — DIASTOLIC BLOOD PRESSURE: 65 MMHG

## 2019-03-06 DIAGNOSIS — Q87.81: ICD-10-CM

## 2019-03-06 DIAGNOSIS — N18.3: ICD-10-CM

## 2019-03-06 DIAGNOSIS — Z90.49: ICD-10-CM

## 2019-03-06 DIAGNOSIS — Z88.6: ICD-10-CM

## 2019-03-06 DIAGNOSIS — D84.9: ICD-10-CM

## 2019-03-06 DIAGNOSIS — J10.1: Primary | ICD-10-CM

## 2019-03-06 DIAGNOSIS — Z79.82: ICD-10-CM

## 2019-03-06 DIAGNOSIS — Z82.49: ICD-10-CM

## 2019-03-06 DIAGNOSIS — Z80.1: ICD-10-CM

## 2019-03-06 DIAGNOSIS — I25.10: ICD-10-CM

## 2019-03-06 DIAGNOSIS — I12.9: ICD-10-CM

## 2019-03-06 DIAGNOSIS — Z88.8: ICD-10-CM

## 2019-03-06 DIAGNOSIS — Z79.899: ICD-10-CM

## 2019-03-06 DIAGNOSIS — E11.22: ICD-10-CM

## 2019-03-06 DIAGNOSIS — I82.502: ICD-10-CM

## 2019-03-06 DIAGNOSIS — N17.0: ICD-10-CM

## 2019-03-06 DIAGNOSIS — K76.0: ICD-10-CM

## 2019-03-06 DIAGNOSIS — E87.1: ICD-10-CM

## 2019-03-06 DIAGNOSIS — E78.5: ICD-10-CM

## 2019-03-06 DIAGNOSIS — E44.1: ICD-10-CM

## 2019-03-06 DIAGNOSIS — R09.02: ICD-10-CM

## 2019-03-06 DIAGNOSIS — E11.65: ICD-10-CM

## 2019-03-06 DIAGNOSIS — Z79.01: ICD-10-CM

## 2019-03-06 DIAGNOSIS — E66.01: ICD-10-CM

## 2019-03-06 DIAGNOSIS — Z98.891: ICD-10-CM

## 2019-03-06 DIAGNOSIS — E55.9: ICD-10-CM

## 2019-03-06 LAB
ALBUMIN SERPL-MCNC: 3.2 GM/DL (ref 3.1–4.5)
ALP SERPL-CCNC: 143 U/L (ref 45–117)
ALT SERPL W P-5'-P-CCNC: 343 U/L (ref 12–78)
APPEARANCE UR: CLEAR
APTT PPP: 37 SECONDS (ref 20.8–31.5)
AST SERPL-CCNC: 334 IU/L (ref 3–35)
BACTERIA #/AREA URNS HPF: (no result) /[HPF]
BASOPHILS # BLD AUTO: 0 10*3/UL (ref 0–0.1)
BASOPHILS NFR BLD AUTO: 0.3 % (ref 0–1)
BILIRUB UR QL STRIP: NEGATIVE
BUN SERPL-MCNC: 23 MG/DL (ref 7–24)
CASTS URNS QL MICRO: (no result)
CHLORIDE SERPL-SCNC: 99 MMOL/L (ref 98–107)
COLOR UR: YELLOW
CREAT SERPL-MCNC: 1.36 MG/DL (ref 0.55–1.02)
CRYSTALS URNS MICRO: (no result)
EOSINOPHIL # BLD AUTO: 0 % (ref 1–4)
EOSINOPHIL # BLD AUTO: 0 10*3/UL (ref 0–0.4)
EPI CELLS #/AREA URNS HPF: (no result) /[HPF]
ERYTHROCYTE [DISTWIDTH] IN BLOOD BY AUTOMATED COUNT: 13.8 % (ref 0–14.5)
GLUCOSE UR QL: NEGATIVE
HCT VFR BLD AUTO: 46.1 % (ref 37–47)
HGB BLD-MCNC: 14.5 G/DL (ref 12–16)
HGB UR QL STRIP: NEGATIVE
INR BLD: 2.4 (ref 2–3.5)
KETONES UR QL STRIP: NEGATIVE
LEUKOCYTE ESTERASE UR QL STRIP: NEGATIVE
LIPASE SERPL-CCNC: 127 U/L (ref 73–393)
LYMPHOCYTES # BLD AUTO: 0.3 10*3/UL (ref 1.3–4.4)
LYMPHOCYTES NFR BLD AUTO: 4.1 % (ref 27–41)
MCH RBC QN AUTO: 28.4 PG (ref 27–31)
MCHC RBC AUTO-ENTMCNC: 31.5 G/DL (ref 33–37)
MCV RBC AUTO: 90.2 FL (ref 81–99)
MONOCYTES # BLD AUTO: 0.5 10*3/UL (ref 0.1–1)
MONOCYTES NFR BLD MANUAL: 6.1 % (ref 3–9)
NEUT #: 7 10*3/UL (ref 2.3–7.9)
NEUT %: 88.2 % (ref 47–73)
NITRITE UR QL STRIP: NEGATIVE
NRBC BLD QL AUTO: 0 10*3/UL (ref 0–0)
PH UR STRIP: 5 [PH] (ref 5–9)
PLATELET # BLD AUTO: 141 10*3/UL (ref 130–400)
PMV BLD AUTO: 12.7 FL (ref 9.6–12.3)
POTASSIUM SERPL-SCNC: 4.2 MMOL/L (ref 3.5–5.1)
PROT SERPL-MCNC: 6.7 GM/DL (ref 6.4–8.2)
RBC # BLD AUTO: 5.11 10*6/UL (ref 4.1–5.1)
SODIUM SERPL-SCNC: 134 MMOL/L (ref 136–145)
SP GR UR: 1.02 (ref 1–1.03)
TROPONIN I SERPL-MCNC: 0.02 NG/ML (ref ?–0.04)
UROBILINOGEN UR STRIP-MCNC: 0.2 E.U./DL (ref 0.2–1)
WBC #/AREA URNS HPF: (no result) WBC/HPF (ref 0–5)
WBC NRBC COR # BLD AUTO: 7.9 10*3/UL (ref 4.8–10.8)

## 2019-03-07 VITALS — SYSTOLIC BLOOD PRESSURE: 129 MMHG | DIASTOLIC BLOOD PRESSURE: 52 MMHG

## 2019-03-07 VITALS — SYSTOLIC BLOOD PRESSURE: 141 MMHG | DIASTOLIC BLOOD PRESSURE: 56 MMHG

## 2019-03-07 VITALS — DIASTOLIC BLOOD PRESSURE: 53 MMHG

## 2019-03-07 VITALS — DIASTOLIC BLOOD PRESSURE: 58 MMHG | SYSTOLIC BLOOD PRESSURE: 150 MMHG

## 2019-03-07 VITALS — DIASTOLIC BLOOD PRESSURE: 60 MMHG

## 2019-03-07 LAB
25(OH)D3 SERPL-MCNC: 27 NG/ML (ref 30–100)
ALBUMIN SERPL-MCNC: 2.7 GM/DL (ref 3.1–4.5)
ALP SERPL-CCNC: 111 U/L (ref 45–117)
ALT SERPL W P-5'-P-CCNC: 293 U/L (ref 12–78)
AST SERPL-CCNC: 247 IU/L (ref 3–35)
BASOPHILS # BLD AUTO: 0 10*3/UL (ref 0–0.1)
BASOPHILS NFR BLD AUTO: 0.2 % (ref 0–1)
BUN SERPL-MCNC: 17 MG/DL (ref 7–24)
CHLORIDE SERPL-SCNC: 107 MMOL/L (ref 98–107)
CHOLEST SERPL-MCNC: 102 MG/DL (ref ?–200)
CREAT SERPL-MCNC: 1.04 MG/DL (ref 0.55–1.02)
EOSINOPHIL # BLD AUTO: 0 10*3/UL (ref 0–0.4)
EOSINOPHIL # BLD AUTO: 0.2 % (ref 1–4)
ERYTHROCYTE [DISTWIDTH] IN BLOOD BY AUTOMATED COUNT: 14 % (ref 0–14.5)
HCT VFR BLD AUTO: 39 % (ref 37–47)
HDLC SERPL-MCNC: 32 MG/DL (ref 40–60)
HGB BLD-MCNC: 12.4 G/DL (ref 12–16)
INR BLD: 2.8 (ref 2–3.5)
LDLC SERPL DIRECT ASSAY-MCNC: 40 MG/DL (ref 9–159)
LYMPHOCYTES # BLD AUTO: 0.6 10*3/UL (ref 1.3–4.4)
LYMPHOCYTES NFR BLD AUTO: 9.7 % (ref 27–41)
MCH RBC QN AUTO: 28.6 PG (ref 27–31)
MCHC RBC AUTO-ENTMCNC: 31.8 G/DL (ref 33–37)
MCV RBC AUTO: 89.9 FL (ref 81–99)
MONOCYTES # BLD AUTO: 0.6 10*3/UL (ref 0.1–1)
MONOCYTES NFR BLD MANUAL: 9.2 % (ref 3–9)
NEUT #: 5 10*3/UL (ref 2.3–7.9)
NEUT %: 78.9 % (ref 47–73)
NRBC BLD QL AUTO: 0 % (ref 0–0)
PHOSPHATE SERPL-MCNC: 2.3 MG/DL (ref 2.5–4.9)
PLATELET # BLD AUTO: 131 10*3/UL (ref 130–400)
PMV BLD AUTO: 12.5 FL (ref 9.6–12.3)
POTASSIUM SERPL-SCNC: 3.9 MMOL/L (ref 3.5–5.1)
PROT SERPL-MCNC: 5.6 GM/DL (ref 6.4–8.2)
RBC # BLD AUTO: 4.34 10*6/UL (ref 4.1–5.1)
SODIUM SERPL-SCNC: 141 MMOL/L (ref 136–145)
T4 FREE SERPL-MCNC: 1.36 NG/DL (ref 0.76–1.46)
TRIGL SERPL-MCNC: 148 MG/DL (ref ?–150)
TSH SERPL DL<=0.005 MIU/L-ACNC: 3.33 UIU/ML (ref 0.36–4.75)
VITAMIN B12: 623 PG/ML (ref 247–911)
VLDLC SERPL CALC-MCNC: 30 MG/DL (ref 6–40)
WBC NRBC COR # BLD AUTO: 6.3 10*3/UL (ref 4.8–10.8)

## 2019-03-08 VITALS — DIASTOLIC BLOOD PRESSURE: 64 MMHG

## 2019-03-08 VITALS — SYSTOLIC BLOOD PRESSURE: 146 MMHG | DIASTOLIC BLOOD PRESSURE: 56 MMHG

## 2019-03-08 VITALS — DIASTOLIC BLOOD PRESSURE: 58 MMHG

## 2019-03-08 VITALS — DIASTOLIC BLOOD PRESSURE: 48 MMHG

## 2019-03-08 VITALS — DIASTOLIC BLOOD PRESSURE: 60 MMHG

## 2019-03-08 LAB
BUN SERPL-MCNC: 12 MG/DL (ref 7–24)
CHLORIDE SERPL-SCNC: 104 MMOL/L (ref 98–107)
CREAT SERPL-MCNC: 0.86 MG/DL (ref 0.55–1.02)
ERYTHROCYTE [DISTWIDTH] IN BLOOD BY AUTOMATED COUNT: 14.1 % (ref 0–14.5)
HCT VFR BLD AUTO: 37.8 % (ref 37–47)
HEPATITIS C VIRUS ANTIBODY: <0.1 S/CO (ref 0–0.9)
HGB BLD-MCNC: 11.9 G/DL (ref 12–16)
INR BLD: 3.5 (ref 2–3.5)
MCH RBC QN AUTO: 28.3 PG (ref 27–31)
MCHC RBC AUTO-ENTMCNC: 31.5 G/DL (ref 33–37)
MCV RBC AUTO: 89.8 FL (ref 81–99)
NRBC BLD QL AUTO: 0 10*3/UL (ref 0–0)
PHOSPHATE SERPL-MCNC: 2.4 MG/DL (ref 2.5–4.9)
PLATELET # BLD AUTO: 135 10*3/UL (ref 130–400)
PLATELET SUFFICIENCY: NORMAL
PMV BLD AUTO: 12.4 FL (ref 9.6–12.3)
POTASSIUM SERPL-SCNC: 3.7 MMOL/L (ref 3.5–5.1)
RBC # BLD AUTO: 4.21 10*6/UL (ref 4.1–5.1)
RBC MORPH BLD: NORMAL
SODIUM SERPL-SCNC: 138 MMOL/L (ref 136–145)
TOTAL CELLS COUNTED: 100 #CELLS
WBC NRBC COR # BLD AUTO: 7 10*3/UL (ref 4.8–10.8)

## 2019-03-09 VITALS — SYSTOLIC BLOOD PRESSURE: 152 MMHG | DIASTOLIC BLOOD PRESSURE: 61 MMHG

## 2019-03-09 VITALS — DIASTOLIC BLOOD PRESSURE: 54 MMHG | SYSTOLIC BLOOD PRESSURE: 143 MMHG

## 2019-03-09 VITALS — DIASTOLIC BLOOD PRESSURE: 58 MMHG

## 2019-03-09 VITALS — DIASTOLIC BLOOD PRESSURE: 49 MMHG

## 2019-03-09 VITALS — DIASTOLIC BLOOD PRESSURE: 62 MMHG

## 2019-03-09 LAB
BUN SERPL-MCNC: 11 MG/DL (ref 7–24)
BURR CELLS BLD QL SMEAR: (no result)
CHLORIDE SERPL-SCNC: 106 MMOL/L (ref 98–107)
CREAT SERPL-MCNC: 0.85 MG/DL (ref 0.55–1.02)
ERYTHROCYTE [DISTWIDTH] IN BLOOD BY AUTOMATED COUNT: 14 % (ref 0–14.5)
HCT VFR BLD AUTO: 37.3 % (ref 37–47)
HGB BLD-MCNC: 11.6 G/DL (ref 12–16)
INR BLD: 3.2 (ref 2–3.5)
MCH RBC QN AUTO: 27.9 PG (ref 27–31)
MCHC RBC AUTO-ENTMCNC: 31.1 G/DL (ref 33–37)
MCV RBC AUTO: 89.7 FL (ref 81–99)
NRBC BLD QL AUTO: 0 % (ref 0–0)
PLATELET # BLD AUTO: 126 10*3/UL (ref 130–400)
PLATELET SUFFICIENCY: (no result)
PMV BLD AUTO: 12.5 FL (ref 9.6–12.3)
POTASSIUM SERPL-SCNC: 4.2 MMOL/L (ref 3.5–5.1)
RBC # BLD AUTO: 4.16 10*6/UL (ref 4.1–5.1)
SODIUM SERPL-SCNC: 139 MMOL/L (ref 136–145)
TOTAL CELLS COUNTED: 100 #CELLS
WBC NRBC COR # BLD AUTO: 8 10*3/UL (ref 4.8–10.8)

## 2019-03-10 VITALS — DIASTOLIC BLOOD PRESSURE: 61 MMHG

## 2019-03-10 VITALS — DIASTOLIC BLOOD PRESSURE: 57 MMHG | SYSTOLIC BLOOD PRESSURE: 147 MMHG

## 2019-03-10 VITALS — DIASTOLIC BLOOD PRESSURE: 64 MMHG

## 2019-03-10 VITALS — DIASTOLIC BLOOD PRESSURE: 57 MMHG | SYSTOLIC BLOOD PRESSURE: 121 MMHG

## 2019-03-10 VITALS — SYSTOLIC BLOOD PRESSURE: 134 MMHG | DIASTOLIC BLOOD PRESSURE: 51 MMHG

## 2019-03-10 LAB — INR BLD: 2.8 (ref 2–3.5)

## 2019-03-11 VITALS — DIASTOLIC BLOOD PRESSURE: 57 MMHG

## 2019-03-11 VITALS — DIASTOLIC BLOOD PRESSURE: 58 MMHG | SYSTOLIC BLOOD PRESSURE: 145 MMHG

## 2019-03-11 LAB
BUN SERPL-MCNC: 17 MG/DL (ref 7–24)
CHLORIDE SERPL-SCNC: 103 MMOL/L (ref 98–107)
CREAT SERPL-MCNC: 0.91 MG/DL (ref 0.55–1.02)
ERYTHROCYTE [DISTWIDTH] IN BLOOD BY AUTOMATED COUNT: 13.8 % (ref 0–14.5)
HCT VFR BLD AUTO: 36.6 % (ref 37–47)
HGB BLD-MCNC: 11.5 G/DL (ref 12–16)
INR BLD: 2.5 (ref 2–3.5)
MCH RBC QN AUTO: 28 PG (ref 27–31)
MCHC RBC AUTO-ENTMCNC: 31.4 G/DL (ref 33–37)
MCV RBC AUTO: 89.3 FL (ref 81–99)
NRBC BLD QL AUTO: 0 10*3/UL (ref 0–0)
PLATELET # BLD AUTO: 181 10*3/UL (ref 130–400)
PLATELET SUFFICIENCY: NORMAL
PMV BLD AUTO: 12.1 FL (ref 9.6–12.3)
POTASSIUM SERPL-SCNC: 5 MMOL/L (ref 3.5–5.1)
RBC # BLD AUTO: 4.1 10*6/UL (ref 4.1–5.1)
RBC MORPH BLD: NORMAL
SODIUM SERPL-SCNC: 134 MMOL/L (ref 136–145)
TOTAL CELLS COUNTED: 100 #CELLS
WBC NRBC COR # BLD AUTO: 9.8 10*3/UL (ref 4.8–10.8)

## 2019-10-15 ENCOUNTER — HOSPITAL ENCOUNTER (OUTPATIENT)
Dept: HOSPITAL 83 - RESCLI | Age: 64
Discharge: HOME | End: 2019-10-15
Attending: INTERNAL MEDICINE
Payer: COMMERCIAL

## 2019-10-15 DIAGNOSIS — Q87.81: ICD-10-CM

## 2019-10-15 DIAGNOSIS — Z79.899: ICD-10-CM

## 2019-10-15 DIAGNOSIS — Z94.0: ICD-10-CM

## 2019-10-15 DIAGNOSIS — I82.532: ICD-10-CM

## 2019-10-15 DIAGNOSIS — E78.2: ICD-10-CM

## 2019-10-15 DIAGNOSIS — I10: ICD-10-CM

## 2019-10-15 DIAGNOSIS — I25.10: ICD-10-CM

## 2019-10-15 DIAGNOSIS — Z79.4: ICD-10-CM

## 2019-10-15 DIAGNOSIS — Z23: Primary | ICD-10-CM

## 2019-10-15 DIAGNOSIS — Z79.82: ICD-10-CM

## 2019-10-15 DIAGNOSIS — G43.019: ICD-10-CM

## 2019-10-15 DIAGNOSIS — Z90.49: ICD-10-CM

## 2019-10-15 DIAGNOSIS — E11.9: ICD-10-CM

## 2019-10-15 DIAGNOSIS — I48.0: ICD-10-CM

## 2019-10-15 DIAGNOSIS — J02.9: ICD-10-CM

## 2019-10-15 DIAGNOSIS — Z79.02: ICD-10-CM

## 2019-10-15 DIAGNOSIS — E03.9: ICD-10-CM

## 2019-10-15 DIAGNOSIS — I25.2: ICD-10-CM

## 2019-10-15 DIAGNOSIS — Z76.89: ICD-10-CM

## 2019-10-15 DIAGNOSIS — F33.41: ICD-10-CM

## 2019-11-20 ENCOUNTER — HOSPITAL ENCOUNTER (OUTPATIENT)
Dept: HOSPITAL 83 - RESCLI | Age: 64
Discharge: HOME | End: 2019-11-20
Attending: STUDENT IN AN ORGANIZED HEALTH CARE EDUCATION/TRAINING PROGRAM
Payer: COMMERCIAL

## 2019-11-20 DIAGNOSIS — Z79.899: ICD-10-CM

## 2019-11-20 DIAGNOSIS — G43.019: ICD-10-CM

## 2019-11-20 DIAGNOSIS — F33.41: Primary | ICD-10-CM

## 2019-11-20 DIAGNOSIS — Z90.89: ICD-10-CM

## 2019-11-20 DIAGNOSIS — D64.9: ICD-10-CM

## 2019-11-20 DIAGNOSIS — E11.9: ICD-10-CM

## 2019-11-20 DIAGNOSIS — I48.0: ICD-10-CM

## 2019-11-20 DIAGNOSIS — Q87.81: ICD-10-CM

## 2019-11-20 DIAGNOSIS — I82.532: ICD-10-CM

## 2019-11-20 DIAGNOSIS — E78.2: ICD-10-CM

## 2019-11-20 DIAGNOSIS — I25.10: ICD-10-CM

## 2019-11-20 DIAGNOSIS — Z90.49: ICD-10-CM

## 2019-11-20 DIAGNOSIS — E03.9: ICD-10-CM

## 2020-01-30 ENCOUNTER — HOSPITAL ENCOUNTER (OUTPATIENT)
Dept: HOSPITAL 83 - RESCLI | Age: 65
Discharge: HOME | End: 2020-01-30
Attending: INTERNAL MEDICINE
Payer: COMMERCIAL

## 2020-01-30 DIAGNOSIS — Z95.5: ICD-10-CM

## 2020-01-30 DIAGNOSIS — Q87.81: ICD-10-CM

## 2020-01-30 DIAGNOSIS — F33.41: ICD-10-CM

## 2020-01-30 DIAGNOSIS — Z90.89: ICD-10-CM

## 2020-01-30 DIAGNOSIS — Z79.899: ICD-10-CM

## 2020-01-30 DIAGNOSIS — Z94.0: ICD-10-CM

## 2020-01-30 DIAGNOSIS — I10: ICD-10-CM

## 2020-01-30 DIAGNOSIS — J06.9: ICD-10-CM

## 2020-01-30 DIAGNOSIS — E11.9: ICD-10-CM

## 2020-01-30 DIAGNOSIS — I25.10: ICD-10-CM

## 2020-01-30 DIAGNOSIS — E78.5: ICD-10-CM

## 2020-01-30 DIAGNOSIS — Z90.49: ICD-10-CM

## 2020-01-30 DIAGNOSIS — I48.0: ICD-10-CM

## 2020-01-30 DIAGNOSIS — E03.9: ICD-10-CM

## 2020-01-30 DIAGNOSIS — Z79.4: ICD-10-CM

## 2020-01-30 DIAGNOSIS — Z12.39: Primary | ICD-10-CM

## 2020-01-30 DIAGNOSIS — Z88.8: ICD-10-CM

## 2020-03-12 ENCOUNTER — HOSPITAL ENCOUNTER (OUTPATIENT)
Dept: HOSPITAL 83 - LAB | Age: 65
Discharge: HOME | End: 2020-03-12
Attending: STUDENT IN AN ORGANIZED HEALTH CARE EDUCATION/TRAINING PROGRAM
Payer: COMMERCIAL

## 2020-03-12 DIAGNOSIS — E11.9: Primary | ICD-10-CM

## 2020-03-12 DIAGNOSIS — I48.0: ICD-10-CM

## 2020-03-12 DIAGNOSIS — Z94.0: ICD-10-CM

## 2020-03-12 LAB
25(OH)D3 SERPL-MCNC: 33.4 NG/ML (ref 30–100)
ALBUMIN SERPL-MCNC: 3.5 GM/DL (ref 3.1–4.5)
ALP SERPL-CCNC: 112 U/L (ref 45–117)
ALT SERPL W P-5'-P-CCNC: 42 U/L (ref 12–78)
APPEARANCE UR: (no result)
AST SERPL-CCNC: 28 IU/L (ref 3–35)
BACTERIA #/AREA URNS HPF: (no result) /[HPF]
BASOPHILS # BLD AUTO: 0 10*3/UL (ref 0–0.1)
BASOPHILS NFR BLD AUTO: 0.4 % (ref 0–1)
BILIRUB UR QL STRIP: NEGATIVE
BUN SERPL-MCNC: 21 MG/DL (ref 7–24)
CHLORIDE SERPL-SCNC: 102 MMOL/L (ref 98–107)
CHOLEST SERPL-MCNC: 157 MG/DL (ref ?–200)
CK SERPL-CCNC: 79 U/L (ref 26–192)
COLOR UR: YELLOW
CREAT SERPL-MCNC: 1.31 MG/DL (ref 0.55–1.02)
CREAT UR-MCNC: 132 MG/DL
EOSINOPHIL # BLD AUTO: 0.3 10*3/UL (ref 0–0.4)
EOSINOPHIL # BLD AUTO: 3.5 % (ref 1–4)
EPI CELLS #/AREA URNS HPF: (no result) /[HPF]
ERYTHROCYTE [DISTWIDTH] IN BLOOD BY AUTOMATED COUNT: 13.2 % (ref 0–14.5)
GLUCOSE UR QL: NEGATIVE
HCT VFR BLD AUTO: 43.2 % (ref 37–47)
HDLC SERPL-MCNC: 55 MG/DL (ref 40–60)
HGB BLD-MCNC: 13.5 G/DL (ref 12–16)
HGB UR QL STRIP: NEGATIVE
INR BLD: 2.1 (ref 2–3.5)
KETONES UR QL STRIP: NEGATIVE
LDH SERPL-CCNC: 187 U/L (ref 84–246)
LDLC SERPL DIRECT ASSAY-MCNC: 74 MG/DL (ref 9–159)
LEUKOCYTE ESTERASE UR QL STRIP: (no result)
LYMPHOCYTES # BLD AUTO: 1.2 10*3/UL (ref 1.3–4.4)
LYMPHOCYTES NFR BLD AUTO: 17 % (ref 27–41)
MCH RBC QN AUTO: 28.9 PG (ref 27–31)
MCHC RBC AUTO-ENTMCNC: 31.3 G/DL (ref 33–37)
MCV RBC AUTO: 92.5 FL (ref 81–99)
MONOCYTES # BLD AUTO: 0.6 10*3/UL (ref 0.1–1)
MONOCYTES NFR BLD MANUAL: 9 % (ref 3–9)
NEUT #: 4.9 10*3/UL (ref 2.3–7.9)
NEUT %: 69.7 % (ref 47–73)
NITRITE UR QL STRIP: NEGATIVE
NRBC BLD QL AUTO: 0 10*3/UL (ref 0–0)
PH UR STRIP: 8 [PH] (ref 5–9)
PHOSPHATE SERPL-MCNC: 3.4 MG/DL (ref 2.5–4.9)
PLATELET # BLD AUTO: 173 10*3/UL (ref 130–400)
PMV BLD AUTO: 12.5 FL (ref 9.6–12.3)
POTASSIUM SERPL-SCNC: 3.9 MMOL/L (ref 3.5–5.1)
PROT SERPL-MCNC: 6.7 GM/DL (ref 6.4–8.2)
PTH-INTACT SERPL-MCNC: 124.2 PG/ML (ref 18.5–88)
RBC # BLD AUTO: 4.67 10*6/UL (ref 4.1–5.1)
RBC #/AREA URNS HPF: (no result) RBC/HPF (ref 0–2)
SODIUM SERPL-SCNC: 138 MMOL/L (ref 136–145)
SP GR UR: 1.01 (ref 1–1.03)
TRIGL SERPL-MCNC: 142 MG/DL (ref ?–150)
URATE SERPL-MCNC: 4.6 MG/DL (ref 2.6–6)
UROBILINOGEN UR STRIP-MCNC: 0.2 E.U./DL (ref 0.2–1)
VLDLC SERPL CALC-MCNC: 28 MG/DL (ref 6–40)
WBC #/AREA URNS HPF: (no result) WBC/HPF (ref 0–5)
WBC NRBC COR # BLD AUTO: 7.1 10*3/UL (ref 4.8–10.8)

## 2020-03-13 LAB
CK BB CFR SERPL ELPH: 0 %
CK MACRO1 CFR SERPL: 0 %
CK MACRO2 CFR SERPL: 0 %
CK MB CFR SERPL ELPH: 0 % (ref 0–3)
CK MM CFR SERPL ELPH: 100 % (ref 97–100)

## 2020-07-29 ENCOUNTER — HOSPITAL ENCOUNTER (OUTPATIENT)
Dept: HOSPITAL 83 - RESCLI | Age: 65
Discharge: HOME | End: 2020-07-29
Attending: INTERNAL MEDICINE
Payer: COMMERCIAL

## 2020-07-29 DIAGNOSIS — E11.9: ICD-10-CM

## 2020-07-29 DIAGNOSIS — Z94.0: ICD-10-CM

## 2020-07-29 DIAGNOSIS — I82.532: ICD-10-CM

## 2020-07-29 DIAGNOSIS — Z98.890: ICD-10-CM

## 2020-07-29 DIAGNOSIS — Q87.81: ICD-10-CM

## 2020-07-29 DIAGNOSIS — I48.0: ICD-10-CM

## 2020-07-29 DIAGNOSIS — Z90.49: ICD-10-CM

## 2020-07-29 DIAGNOSIS — Z79.899: ICD-10-CM

## 2020-07-29 DIAGNOSIS — I25.10: ICD-10-CM

## 2020-07-29 DIAGNOSIS — Z88.8: ICD-10-CM

## 2020-07-29 DIAGNOSIS — R29.6: Primary | ICD-10-CM

## 2020-07-29 DIAGNOSIS — E03.9: ICD-10-CM

## 2020-07-29 DIAGNOSIS — I10: ICD-10-CM

## 2020-07-29 DIAGNOSIS — Z95.5: ICD-10-CM

## 2020-07-29 DIAGNOSIS — E78.5: ICD-10-CM

## 2020-07-29 DIAGNOSIS — F32.9: ICD-10-CM

## 2020-10-12 ENCOUNTER — HOSPITAL ENCOUNTER (OUTPATIENT)
Dept: HOSPITAL 83 - LAB | Age: 65
Discharge: HOME | End: 2020-10-12
Attending: INTERNAL MEDICINE
Payer: COMMERCIAL

## 2020-10-12 DIAGNOSIS — Z94.0: Primary | ICD-10-CM

## 2020-10-12 LAB
ALBUMIN SERPL-MCNC: 3.4 GM/DL (ref 3.1–4.5)
ALP SERPL-CCNC: 128 U/L (ref 45–117)
ALT SERPL W P-5'-P-CCNC: 54 U/L (ref 12–78)
AST SERPL-CCNC: 45 IU/L (ref 3–35)
BACTERIA #/AREA URNS HPF: (no result) /[HPF]
BASOPHILS # BLD AUTO: 0 10*3/UL (ref 0–0.1)
BASOPHILS NFR BLD AUTO: 0.3 % (ref 0–1)
BUN SERPL-MCNC: 21 MG/DL (ref 7–24)
CASTS URNS QL MICRO: (no result)
CHLORIDE SERPL-SCNC: 105 MMOL/L (ref 98–107)
CREAT SERPL-MCNC: 1.26 MG/DL (ref 0.55–1.02)
CREAT UR-MCNC: 115 MG/DL
EOSINOPHIL # BLD AUTO: 0.1 10*3/UL (ref 0–0.4)
EOSINOPHIL # BLD AUTO: 2.3 % (ref 1–4)
EPI CELLS #/AREA URNS HPF: (no result) /[HPF]
ERYTHROCYTE [DISTWIDTH] IN BLOOD BY AUTOMATED COUNT: 13.9 % (ref 0–14.5)
HCT VFR BLD AUTO: 44.5 % (ref 37–47)
LDH SERPL-CCNC: 211 U/L (ref 84–246)
LYMPHOCYTES # BLD AUTO: 1.3 10*3/UL (ref 1.3–4.4)
LYMPHOCYTES NFR BLD AUTO: 21.1 % (ref 27–41)
MCH RBC QN AUTO: 27.2 PG (ref 27–31)
MCHC RBC AUTO-ENTMCNC: 31 G/DL (ref 33–37)
MCV RBC AUTO: 87.8 FL (ref 81–99)
MONOCYTES # BLD AUTO: 0.6 10*3/UL (ref 0.1–1)
MONOCYTES NFR BLD MANUAL: 9.2 % (ref 3–9)
NEUT #: 4.1 10*3/UL (ref 2.3–7.9)
NEUT %: 66.8 % (ref 47–73)
NRBC BLD QL AUTO: 0 % (ref 0–0)
PH UR STRIP: 7.5 [PH] (ref 4.5–8)
PLATELET # BLD AUTO: 181 10*3/UL (ref 130–400)
PMV BLD AUTO: 12.6 FL (ref 9.6–12.3)
POTASSIUM SERPL-SCNC: 4.3 MMOL/L (ref 3.5–5.1)
PROT SERPL-MCNC: 7 GM/DL (ref 6.4–8.2)
RBC # BLD AUTO: 5.07 10*6/UL (ref 4.1–5.1)
SODIUM SERPL-SCNC: 139 MMOL/L (ref 136–145)
SP GR UR: 1.02 (ref 1–1.03)
URATE SERPL-MCNC: 3.9 MG/DL (ref 2.6–6)
UROBILINOGEN UR STRIP-MCNC: 1 E.U./DL (ref 0–1)
WBC #/AREA URNS HPF: (no result) WBC/HPF (ref 0–5)
WBC NRBC COR # BLD AUTO: 6.2 10*3/UL (ref 4.8–10.8)

## 2020-10-13 LAB
CK BB CFR SERPL ELPH: 0 %
CK MACRO1 CFR SERPL: 2 %
CK MACRO2 CFR SERPL: 0 %
CK MB CFR SERPL ELPH: 0 % (ref 0–3)
CK MM CFR SERPL ELPH: 98 % (ref 97–100)

## 2020-11-10 ENCOUNTER — HOSPITAL ENCOUNTER (OUTPATIENT)
Dept: HOSPITAL 83 - RESCLI | Age: 65
Discharge: HOME | End: 2020-11-10
Attending: STUDENT IN AN ORGANIZED HEALTH CARE EDUCATION/TRAINING PROGRAM
Payer: COMMERCIAL

## 2020-11-10 DIAGNOSIS — E03.9: ICD-10-CM

## 2020-11-10 DIAGNOSIS — I25.10: ICD-10-CM

## 2020-11-10 DIAGNOSIS — E78.5: ICD-10-CM

## 2020-11-10 DIAGNOSIS — I48.0: ICD-10-CM

## 2020-11-10 DIAGNOSIS — F32.9: ICD-10-CM

## 2020-11-10 DIAGNOSIS — Z95.5: ICD-10-CM

## 2020-11-10 DIAGNOSIS — R29.6: ICD-10-CM

## 2020-11-10 DIAGNOSIS — Z12.4: Primary | ICD-10-CM

## 2020-11-10 DIAGNOSIS — E11.9: ICD-10-CM

## 2020-11-10 DIAGNOSIS — Z94.0: ICD-10-CM

## 2021-01-04 ENCOUNTER — HOSPITAL ENCOUNTER (OUTPATIENT)
Dept: HOSPITAL 83 - LAB | Age: 66
Discharge: HOME | End: 2021-01-04
Attending: INTERNAL MEDICINE
Payer: COMMERCIAL

## 2021-01-04 DIAGNOSIS — Z94.0: Primary | ICD-10-CM

## 2021-01-04 LAB
ALBUMIN SERPL-MCNC: 3.3 GM/DL (ref 3.1–4.5)
BACTERIA #/AREA URNS HPF: (no result) /[HPF]
BASOPHILS # BLD AUTO: 0 10*3/UL (ref 0–0.1)
BASOPHILS NFR BLD AUTO: 0.5 % (ref 0–1)
BUN SERPL-MCNC: 23 MG/DL (ref 7–24)
CHLORIDE SERPL-SCNC: 105 MMOL/L (ref 98–107)
CREAT SERPL-MCNC: 1.27 MG/DL (ref 0.55–1.02)
EOSINOPHIL # BLD AUTO: 0.1 10*3/UL (ref 0–0.4)
EOSINOPHIL # BLD AUTO: 1.7 % (ref 1–4)
EPI CELLS #/AREA URNS HPF: (no result) /[HPF]
ERYTHROCYTE [DISTWIDTH] IN BLOOD BY AUTOMATED COUNT: 13.6 % (ref 0–14.5)
HCT VFR BLD AUTO: 43.5 % (ref 37–47)
LYMPHOCYTES # BLD AUTO: 1.4 10*3/UL (ref 1.3–4.4)
LYMPHOCYTES NFR BLD AUTO: 21.7 % (ref 27–41)
MCH RBC QN AUTO: 27.6 PG (ref 27–31)
MCHC RBC AUTO-ENTMCNC: 30.8 G/DL (ref 33–37)
MCV RBC AUTO: 89.5 FL (ref 81–99)
MONOCYTES # BLD AUTO: 0.7 10*3/UL (ref 0.1–1)
MONOCYTES NFR BLD MANUAL: 10.6 % (ref 3–9)
NEUT #: 4.3 10*3/UL (ref 2.3–7.9)
NEUT %: 65.2 % (ref 47–73)
NRBC BLD QL AUTO: 0 10*3/UL (ref 0–0)
PH UR STRIP: 7.5 [PH] (ref 4.5–8)
PLATELET # BLD AUTO: 172 10*3/UL (ref 130–400)
PMV BLD AUTO: 12 FL (ref 9.6–12.3)
POTASSIUM SERPL-SCNC: 4 MMOL/L (ref 3.5–5.1)
RBC # BLD AUTO: 4.86 10*6/UL (ref 4.1–5.1)
SODIUM SERPL-SCNC: 140 MMOL/L (ref 136–145)
SP GR UR: 1.02 (ref 1–1.03)
URATE SERPL-MCNC: 4.1 MG/DL (ref 2.6–6)
UROBILINOGEN UR STRIP-MCNC: 1 E.U./DL (ref 0–1)
WBC #/AREA URNS HPF: (no result) WBC/HPF (ref 0–5)
WBC NRBC COR # BLD AUTO: 6.6 10*3/UL (ref 4.8–10.8)

## 2021-01-22 ENCOUNTER — HOSPITAL ENCOUNTER (OUTPATIENT)
Dept: HOSPITAL 83 - COVID19 | Age: 66
Discharge: HOME | End: 2021-01-22
Attending: STUDENT IN AN ORGANIZED HEALTH CARE EDUCATION/TRAINING PROGRAM
Payer: COMMERCIAL

## 2021-01-22 DIAGNOSIS — U07.1: Primary | ICD-10-CM

## 2021-03-08 ENCOUNTER — HOSPITAL ENCOUNTER (OUTPATIENT)
Dept: HOSPITAL 83 - LAB | Age: 66
Discharge: HOME | End: 2021-03-08
Attending: INTERNAL MEDICINE
Payer: COMMERCIAL

## 2021-03-08 DIAGNOSIS — Z94.0: Primary | ICD-10-CM

## 2021-03-08 LAB
ALBUMIN SERPL-MCNC: 3.2 GM/DL (ref 3.1–4.5)
BACTERIA #/AREA URNS HPF: (no result) /[HPF]
BASOPHILS # BLD AUTO: 0 10*3/UL (ref 0–0.1)
BASOPHILS NFR BLD AUTO: 0.6 % (ref 0–1)
BUN SERPL-MCNC: 21 MG/DL (ref 7–24)
CHLORIDE SERPL-SCNC: 103 MMOL/L (ref 98–107)
CREAT SERPL-MCNC: 1.33 MG/DL (ref 0.55–1.02)
EOSINOPHIL # BLD AUTO: 0.1 10*3/UL (ref 0–0.4)
EOSINOPHIL # BLD AUTO: 1.7 % (ref 1–4)
EPI CELLS #/AREA URNS HPF: (no result) /[HPF]
ERYTHROCYTE [DISTWIDTH] IN BLOOD BY AUTOMATED COUNT: 14.2 % (ref 0–14.5)
HCT VFR BLD AUTO: 43 % (ref 37–47)
LEUKOCYTE ESTERASE UR QL STRIP: (no result)
LYMPHOCYTES # BLD AUTO: 1.2 10*3/UL (ref 1.3–4.4)
LYMPHOCYTES NFR BLD AUTO: 16.6 % (ref 27–41)
MCH RBC QN AUTO: 28 PG (ref 27–31)
MCHC RBC AUTO-ENTMCNC: 31.9 G/DL (ref 33–37)
MCV RBC AUTO: 87.8 FL (ref 81–99)
MONOCYTES # BLD AUTO: 0.7 10*3/UL (ref 0.1–1)
MONOCYTES NFR BLD MANUAL: 9.6 % (ref 3–9)
NEUT #: 5.1 10*3/UL (ref 2.3–7.9)
NEUT %: 71.2 % (ref 47–73)
NRBC BLD QL AUTO: 0 % (ref 0–0)
PH UR STRIP: 6.5 [PH] (ref 4.5–8)
PLATELET # BLD AUTO: 180 10*3/UL (ref 130–400)
PMV BLD AUTO: 12.5 FL (ref 9.6–12.3)
POTASSIUM SERPL-SCNC: 4.2 MMOL/L (ref 3.5–5.1)
RBC # BLD AUTO: 4.9 10*6/UL (ref 4.1–5.1)
SODIUM SERPL-SCNC: 138 MMOL/L (ref 136–145)
SP GR UR: 1.02 (ref 1–1.03)
URATE SERPL-MCNC: 4.2 MG/DL (ref 2.6–6)
UROBILINOGEN UR STRIP-MCNC: 1 E.U./DL (ref 0–1)
WBC #/AREA URNS HPF: (no result) WBC/HPF (ref 0–5)
WBC NRBC COR # BLD AUTO: 7.1 10*3/UL (ref 4.8–10.8)

## 2021-03-29 ENCOUNTER — HOSPITAL ENCOUNTER (OUTPATIENT)
Dept: HOSPITAL 83 - RESCLI | Age: 66
Discharge: HOME | End: 2021-03-29
Attending: STUDENT IN AN ORGANIZED HEALTH CARE EDUCATION/TRAINING PROGRAM
Payer: COMMERCIAL

## 2021-03-29 DIAGNOSIS — I25.10: ICD-10-CM

## 2021-03-29 DIAGNOSIS — Z90.49: ICD-10-CM

## 2021-03-29 DIAGNOSIS — I48.0: ICD-10-CM

## 2021-03-29 DIAGNOSIS — Z95.828: ICD-10-CM

## 2021-03-29 DIAGNOSIS — E11.9: ICD-10-CM

## 2021-03-29 DIAGNOSIS — Z98.890: ICD-10-CM

## 2021-03-29 DIAGNOSIS — R27.0: ICD-10-CM

## 2021-03-29 DIAGNOSIS — I10: ICD-10-CM

## 2021-03-29 DIAGNOSIS — Z79.899: ICD-10-CM

## 2021-03-29 DIAGNOSIS — K59.00: Primary | ICD-10-CM

## 2021-03-29 DIAGNOSIS — Z94.0: ICD-10-CM

## 2021-03-29 DIAGNOSIS — Z95.5: ICD-10-CM

## 2021-03-29 DIAGNOSIS — Z12.31: ICD-10-CM

## 2021-03-29 DIAGNOSIS — E03.9: ICD-10-CM

## 2021-03-29 DIAGNOSIS — E78.5: ICD-10-CM

## 2021-03-29 DIAGNOSIS — F33.41: ICD-10-CM

## 2021-05-13 ENCOUNTER — HOSPITAL ENCOUNTER (OUTPATIENT)
Dept: HOSPITAL 83 - LAB | Age: 66
Discharge: HOME | End: 2021-05-13
Attending: INTERNAL MEDICINE
Payer: COMMERCIAL

## 2021-05-13 DIAGNOSIS — Z94.0: Primary | ICD-10-CM

## 2021-05-13 LAB
ALBUMIN SERPL-MCNC: 3.4 GM/DL (ref 3.1–4.5)
BACTERIA #/AREA URNS HPF: (no result) /[HPF]
BASOPHILS # BLD AUTO: 0 10*3/UL (ref 0–0.1)
BASOPHILS NFR BLD AUTO: 0.5 % (ref 0–1)
BUN SERPL-MCNC: 20 MG/DL (ref 7–24)
CASTS URNS QL MICRO: (no result)
CHLORIDE SERPL-SCNC: 105 MMOL/L (ref 98–107)
CREAT SERPL-MCNC: 1.21 MG/DL (ref 0.55–1.02)
EOSINOPHIL # BLD AUTO: 0.1 10*3/UL (ref 0–0.4)
EOSINOPHIL # BLD AUTO: 1.5 % (ref 1–4)
EPI CELLS #/AREA URNS HPF: (no result) /[HPF]
ERYTHROCYTE [DISTWIDTH] IN BLOOD BY AUTOMATED COUNT: 13.6 % (ref 0–14.5)
HCT VFR BLD AUTO: 44.1 % (ref 37–47)
LYMPHOCYTES # BLD AUTO: 1.2 10*3/UL (ref 1.3–4.4)
LYMPHOCYTES NFR BLD AUTO: 20.2 % (ref 27–41)
MCH RBC QN AUTO: 28.1 PG (ref 27–31)
MCHC RBC AUTO-ENTMCNC: 32.2 G/DL (ref 33–37)
MCV RBC AUTO: 87.2 FL (ref 81–99)
MONOCYTES # BLD AUTO: 0.6 10*3/UL (ref 0.1–1)
MONOCYTES NFR BLD MANUAL: 10.3 % (ref 3–9)
NEUT #: 4.1 10*3/UL (ref 2.3–7.9)
NEUT %: 67.2 % (ref 47–73)
NRBC BLD QL AUTO: 0 10*3/UL (ref 0–0)
PH UR STRIP: 7 [PH] (ref 4.5–8)
PLATELET # BLD AUTO: 186 10*3/UL (ref 130–400)
PMV BLD AUTO: 12.6 FL (ref 9.6–12.3)
POTASSIUM SERPL-SCNC: 4.2 MMOL/L (ref 3.5–5.1)
RBC # BLD AUTO: 5.06 10*6/UL (ref 4.1–5.1)
SODIUM SERPL-SCNC: 139 MMOL/L (ref 136–145)
SP GR UR: 1.02 (ref 1–1.03)
URATE SERPL-MCNC: 3.9 MG/DL (ref 2.6–6)
UROBILINOGEN UR STRIP-MCNC: 1 E.U./DL (ref 0–1)
WBC #/AREA URNS HPF: (no result) WBC/HPF (ref 0–5)
WBC NRBC COR # BLD AUTO: 6 10*3/UL (ref 4.8–10.8)

## 2021-06-08 ENCOUNTER — HOSPITAL ENCOUNTER (OUTPATIENT)
Dept: HOSPITAL 83 - RESCLI | Age: 66
Discharge: HOME | End: 2021-06-08
Attending: STUDENT IN AN ORGANIZED HEALTH CARE EDUCATION/TRAINING PROGRAM
Payer: MEDICAID

## 2021-06-08 DIAGNOSIS — E11.9: Primary | ICD-10-CM

## 2021-06-08 DIAGNOSIS — Z94.0: ICD-10-CM

## 2021-06-08 DIAGNOSIS — Q87.81: ICD-10-CM

## 2021-06-08 DIAGNOSIS — Z90.49: ICD-10-CM

## 2021-06-08 DIAGNOSIS — I11.0: ICD-10-CM

## 2021-06-08 DIAGNOSIS — I48.0: ICD-10-CM

## 2021-06-08 DIAGNOSIS — Z95.828: ICD-10-CM

## 2021-06-08 DIAGNOSIS — Z79.899: ICD-10-CM

## 2021-06-08 DIAGNOSIS — E03.9: ICD-10-CM

## 2021-06-08 DIAGNOSIS — Z88.8: ICD-10-CM

## 2021-06-08 DIAGNOSIS — Z95.5: ICD-10-CM

## 2021-06-08 DIAGNOSIS — I50.32: ICD-10-CM

## 2021-06-08 DIAGNOSIS — I25.10: ICD-10-CM

## 2021-06-08 DIAGNOSIS — E78.5: ICD-10-CM

## 2021-06-08 DIAGNOSIS — F32.9: ICD-10-CM

## 2021-06-08 DIAGNOSIS — R27.0: ICD-10-CM

## 2021-06-30 ENCOUNTER — HOSPITAL ENCOUNTER (OUTPATIENT)
Dept: HOSPITAL 83 - MRI | Age: 66
Discharge: HOME | End: 2021-06-30
Attending: PSYCHIATRY & NEUROLOGY
Payer: COMMERCIAL

## 2021-06-30 DIAGNOSIS — I65.23: Primary | ICD-10-CM

## 2021-06-30 DIAGNOSIS — R27.0: ICD-10-CM

## 2021-07-14 ENCOUNTER — HOSPITAL ENCOUNTER (OUTPATIENT)
Dept: HOSPITAL 83 - LAB | Age: 66
Discharge: HOME | End: 2021-07-14
Attending: INTERNAL MEDICINE
Payer: COMMERCIAL

## 2021-07-14 DIAGNOSIS — E55.9: Primary | ICD-10-CM

## 2021-07-14 DIAGNOSIS — Z94.0: ICD-10-CM

## 2021-07-14 DIAGNOSIS — R27.0: ICD-10-CM

## 2021-07-14 LAB
25(OH)D3 SERPL-MCNC: 26 NG/ML (ref 30–100)
ALBUMIN SERPL-MCNC: 3.4 GM/DL (ref 3.1–4.5)
ALBUMIN SERPL-MCNC: 3.4 GM/DL (ref 3.1–4.5)
ALP SERPL-CCNC: 116 U/L (ref 45–117)
ALT SERPL W P-5'-P-CCNC: 39 U/L (ref 12–78)
AST SERPL-CCNC: 32 IU/L (ref 3–35)
BACTERIA #/AREA URNS HPF: (no result) /[HPF]
BASOPHILS # BLD AUTO: 0 10*3/UL (ref 0–0.1)
BASOPHILS NFR BLD AUTO: 0.5 % (ref 0–1)
BUN SERPL-MCNC: 19 MG/DL (ref 7–24)
BUN SERPL-MCNC: 19 MG/DL (ref 7–24)
CASTS URNS QL MICRO: (no result)
CHLORIDE SERPL-SCNC: 106 MMOL/L (ref 98–107)
CHLORIDE SERPL-SCNC: 107 MMOL/L (ref 98–107)
CREAT SERPL-MCNC: 1.22 MG/DL (ref 0.55–1.02)
CREAT SERPL-MCNC: 1.24 MG/DL (ref 0.55–1.02)
EOSINOPHIL # BLD AUTO: 0.1 10*3/UL (ref 0–0.4)
EOSINOPHIL # BLD AUTO: 2.1 % (ref 1–4)
EPI CELLS #/AREA URNS HPF: (no result) /[HPF]
ERYTHROCYTE [DISTWIDTH] IN BLOOD BY AUTOMATED COUNT: 14.2 % (ref 0–14.5)
HCT VFR BLD AUTO: 44.2 % (ref 37–47)
LYMPHOCYTES # BLD AUTO: 1.4 10*3/UL (ref 1.3–4.4)
LYMPHOCYTES NFR BLD AUTO: 21.6 % (ref 27–41)
MCH RBC QN AUTO: 27.2 PG (ref 27–31)
MCHC RBC AUTO-ENTMCNC: 31 G/DL (ref 33–37)
MCV RBC AUTO: 87.7 FL (ref 81–99)
MONOCYTES # BLD AUTO: 0.6 10*3/UL (ref 0.1–1)
MONOCYTES NFR BLD MANUAL: 10.1 % (ref 3–9)
NEUT #: 4.1 10*3/UL (ref 2.3–7.9)
NEUT %: 65.4 % (ref 47–73)
NRBC BLD QL AUTO: 0 10*3/UL (ref 0–0)
PH UR STRIP: 7.5 [PH] (ref 4.5–8)
PLATELET # BLD AUTO: 163 10*3/UL (ref 130–400)
PMV BLD AUTO: 11.6 FL (ref 9.6–12.3)
POTASSIUM SERPL-SCNC: 4.1 MMOL/L (ref 3.5–5.1)
POTASSIUM SERPL-SCNC: 4.1 MMOL/L (ref 3.5–5.1)
PROT SERPL-MCNC: 6.9 GM/DL (ref 6.4–8.2)
PTH-INTACT SERPL-MCNC: 169.4 PG/ML (ref 18.5–88)
RBC # BLD AUTO: 5.04 10*6/UL (ref 4.1–5.1)
SODIUM SERPL-SCNC: 137 MMOL/L (ref 136–145)
SODIUM SERPL-SCNC: 139 MMOL/L (ref 136–145)
SP GR UR: 1.02 (ref 1–1.03)
T3 SERPL-MCNC: 31 % (ref 31–39)
T4 SERPL-MCNC: 9.4 UG/DL (ref 4.8–13.9)
TSH SERPL DL<=0.005 MIU/L-ACNC: 11.1 UIU/ML (ref 0.36–4.75)
URATE SERPL-MCNC: 4.4 MG/DL (ref 2.6–6)
UROBILINOGEN UR STRIP-MCNC: 1 E.U./DL (ref 0–1)
VITAMIN B12: 508 PG/ML (ref 247–911)
WBC NRBC COR # BLD AUTO: 6.3 10*3/UL (ref 4.8–10.8)

## 2021-07-15 LAB
ALBUMIN SERPL ELPH-MCNC: 3.2 G/DL (ref 2.9–4.4)
ALBUMIN/GLOB SERPL: 1 {RATIO} (ref 0.7–1.7)
ALPHA1 GLOB SERPL ELPH-MCNC: 0.3 G/DL (ref 0–0.4)
ALPHA2 GLOB SERPL ELPH-MCNC: 1 G/DL (ref 0.4–1)
B-GLOBULIN SERPL ELPH-MCNC: 1 G/DL (ref 0.7–1.3)
GAMMA GLOB SERPL ELPH-MCNC: 0.9 G/DL (ref 0.4–1.8)
GLOBULIN SER CALC-MCNC: 3.1 G/DL (ref 2.2–3.9)
PROT SERPL-MCNC: 6.3 G/DL (ref 6–8.5)

## 2021-07-16 LAB
Lab: 36 UG/ML (ref 15–125)
MYCOPHENOLIC ACID: 3.6 UG/ML (ref 1–3.5)

## 2021-07-18 LAB — METHYLMALONATE SERPL-SCNC: 322 NMOL/L (ref 0–378)

## 2021-09-14 ENCOUNTER — HOSPITAL ENCOUNTER (OUTPATIENT)
Dept: HOSPITAL 83 - LAB | Age: 66
Discharge: HOME | End: 2021-09-14
Attending: INTERNAL MEDICINE
Payer: COMMERCIAL

## 2021-09-14 DIAGNOSIS — Z94.0: ICD-10-CM

## 2021-09-14 DIAGNOSIS — E55.9: Primary | ICD-10-CM

## 2021-09-14 LAB
25(OH)D3 SERPL-MCNC: 28.2 NG/ML (ref 30–100)
ALBUMIN SERPL-MCNC: 3.4 GM/DL (ref 3.1–4.5)
BACTERIA #/AREA URNS HPF: (no result) /[HPF]
BASOPHILS # BLD AUTO: 0 10*3/UL (ref 0–0.1)
BASOPHILS NFR BLD AUTO: 0.3 % (ref 0–1)
BUN SERPL-MCNC: 23 MG/DL (ref 7–24)
CHLORIDE SERPL-SCNC: 103 MMOL/L (ref 98–107)
CREAT SERPL-MCNC: 1.26 MG/DL (ref 0.55–1.02)
EOSINOPHIL # BLD AUTO: 0.1 10*3/UL (ref 0–0.4)
EOSINOPHIL # BLD AUTO: 1.8 % (ref 1–4)
EPI CELLS #/AREA URNS HPF: (no result) /[HPF]
ERYTHROCYTE [DISTWIDTH] IN BLOOD BY AUTOMATED COUNT: 13.3 % (ref 0–14.5)
HCT VFR BLD AUTO: 44.7 % (ref 37–47)
LYMPHOCYTES # BLD AUTO: 1.3 10*3/UL (ref 1.3–4.4)
LYMPHOCYTES NFR BLD AUTO: 21.9 % (ref 27–41)
MCH RBC QN AUTO: 27.5 PG (ref 27–31)
MCHC RBC AUTO-ENTMCNC: 31.3 G/DL (ref 33–37)
MCV RBC AUTO: 87.6 FL (ref 81–99)
MONOCYTES # BLD AUTO: 0.6 10*3/UL (ref 0.1–1)
MONOCYTES NFR BLD MANUAL: 9.1 % (ref 3–9)
NEUT #: 4 10*3/UL (ref 2.3–7.9)
NEUT %: 66.7 % (ref 47–73)
NRBC BLD QL AUTO: 0 % (ref 0–0)
PH UR STRIP: 7 [PH] (ref 4.5–8)
PLATELET # BLD AUTO: 161 10*3/UL (ref 130–400)
PMV BLD AUTO: 12.8 FL (ref 9.6–12.3)
POTASSIUM SERPL-SCNC: 4.1 MMOL/L (ref 3.5–5.1)
PTH-INTACT SERPL-MCNC: 180.1 PG/ML (ref 18.5–88)
RBC # BLD AUTO: 5.1 10*6/UL (ref 4.1–5.1)
RBC #/AREA URNS HPF: (no result) RBC/HPF (ref 0–2)
SODIUM SERPL-SCNC: 137 MMOL/L (ref 136–145)
SP GR UR: 1.01 (ref 1–1.03)
URATE SERPL-MCNC: 3.8 MG/DL (ref 2.6–6)
UROBILINOGEN UR STRIP-MCNC: 1 E.U./DL (ref 0–1)
WBC #/AREA URNS HPF: (no result) WBC/HPF (ref 0–5)
WBC NRBC COR # BLD AUTO: 6.1 10*3/UL (ref 4.8–10.8)

## 2022-01-02 ENCOUNTER — HOSPITAL ENCOUNTER (INPATIENT)
Dept: HOSPITAL 83 - ED | Age: 67
LOS: 3 days | Discharge: NURSING, REHAB OR LONG-TERM CARE | DRG: 155 | End: 2022-01-05
Attending: STUDENT IN AN ORGANIZED HEALTH CARE EDUCATION/TRAINING PROGRAM | Admitting: STUDENT IN AN ORGANIZED HEALTH CARE EDUCATION/TRAINING PROGRAM
Payer: COMMERCIAL

## 2022-01-02 VITALS — SYSTOLIC BLOOD PRESSURE: 183 MMHG | DIASTOLIC BLOOD PRESSURE: 74 MMHG

## 2022-01-02 VITALS — DIASTOLIC BLOOD PRESSURE: 63 MMHG

## 2022-01-02 VITALS — DIASTOLIC BLOOD PRESSURE: 71 MMHG

## 2022-01-02 VITALS — DIASTOLIC BLOOD PRESSURE: 66 MMHG | SYSTOLIC BLOOD PRESSURE: 189 MMHG

## 2022-01-02 VITALS — DIASTOLIC BLOOD PRESSURE: 67 MMHG

## 2022-01-02 VITALS — BODY MASS INDEX: 36.83 KG/M2 | HEIGHT: 65 IN | DIASTOLIC BLOOD PRESSURE: 84 MMHG | WEIGHT: 221.06 LBS

## 2022-01-02 VITALS — SYSTOLIC BLOOD PRESSURE: 174 MMHG | DIASTOLIC BLOOD PRESSURE: 69 MMHG

## 2022-01-02 VITALS — SYSTOLIC BLOOD PRESSURE: 177 MMHG | DIASTOLIC BLOOD PRESSURE: 72 MMHG

## 2022-01-02 DIAGNOSIS — Z82.49: ICD-10-CM
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
## 2022-01-02 DIAGNOSIS — E78.5: ICD-10-CM

## 2022-01-02 DIAGNOSIS — E03.9: ICD-10-CM

## 2022-01-02 DIAGNOSIS — I82.509: ICD-10-CM

## 2022-01-02 DIAGNOSIS — S02.2XXA: Primary | ICD-10-CM

## 2022-01-02 DIAGNOSIS — Z80.1: ICD-10-CM

## 2022-01-02 DIAGNOSIS — D84.9: ICD-10-CM

## 2022-01-02 DIAGNOSIS — S61.412A: ICD-10-CM

## 2022-01-02 DIAGNOSIS — S00.10XA: ICD-10-CM

## 2022-01-02 DIAGNOSIS — N18.32: ICD-10-CM

## 2022-01-02 DIAGNOSIS — S00.11XA: ICD-10-CM

## 2022-01-02 DIAGNOSIS — I25.810: ICD-10-CM

## 2022-01-02 DIAGNOSIS — Z79.82: ICD-10-CM

## 2022-01-02 DIAGNOSIS — S00.83XA: ICD-10-CM

## 2022-01-02 DIAGNOSIS — Z88.6: ICD-10-CM

## 2022-01-02 DIAGNOSIS — Z90.49: ICD-10-CM
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
## 2022-01-02 DIAGNOSIS — Z79.899: ICD-10-CM

## 2022-01-02 DIAGNOSIS — Z94.0: ICD-10-CM

## 2022-01-02 DIAGNOSIS — I16.0: ICD-10-CM

## 2022-01-02 DIAGNOSIS — E11.65: ICD-10-CM
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
## 2022-01-02 DIAGNOSIS — I12.9: ICD-10-CM

## 2022-01-02 DIAGNOSIS — E11.22: ICD-10-CM

## 2022-01-02 DIAGNOSIS — Z88.8: ICD-10-CM

## 2022-01-02 DIAGNOSIS — E66.01: ICD-10-CM

## 2022-01-02 DIAGNOSIS — Z79.4: ICD-10-CM
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
## 2022-01-02 DIAGNOSIS — S80.212A: ICD-10-CM

## 2022-01-02 LAB
ALBUMIN SERPL-MCNC: 3.2 GM/DL (ref 3.1–4.5)
ALP SERPL-CCNC: 121 U/L (ref 45–117)
ALT SERPL W P-5'-P-CCNC: 59 U/L (ref 12–78)
APTT PPP: 33.3 SECONDS (ref 20–32.1)
AST SERPL-CCNC: 49 IU/L (ref 3–35)
BASOPHILS # BLD AUTO: 0 10*3/UL (ref 0–0.1)
BASOPHILS NFR BLD AUTO: 0.5 % (ref 0–1)
BUN SERPL-MCNC: 23 MG/DL (ref 7–24)
CHLORIDE SERPL-SCNC: 105 MMOL/L (ref 98–107)
CK SERPL-CCNC: 136 U/L (ref 26–192)
CREAT SERPL-MCNC: 1.26 MG/DL (ref 0.55–1.02)
EOSINOPHIL # BLD AUTO: 0.2 10*3/UL (ref 0–0.4)
EOSINOPHIL # BLD AUTO: 2 % (ref 1–4)
ERYTHROCYTE [DISTWIDTH] IN BLOOD BY AUTOMATED COUNT: 13.7 % (ref 0–14.5)
HCT VFR BLD AUTO: 45 % (ref 37–47)
INR BLD: 2.5 (ref 2–3.5)
LYMPHOCYTES # BLD AUTO: 1.7 10*3/UL (ref 1.3–4.4)
LYMPHOCYTES NFR BLD AUTO: 22.2 % (ref 27–41)
MCH RBC QN AUTO: 27.9 PG (ref 27–31)
MCHC RBC AUTO-ENTMCNC: 32.7 G/DL (ref 33–37)
MCV RBC AUTO: 85.6 FL (ref 81–99)
MONOCYTES # BLD AUTO: 0.8 10*3/UL (ref 0.1–1)
MONOCYTES NFR BLD MANUAL: 10.2 % (ref 3–9)
NEUT #: 4.7 10*3/UL (ref 2.3–7.9)
NEUT %: 64 % (ref 47–73)
NRBC BLD QL AUTO: 0 10*3/UL (ref 0–0)
PLATELET # BLD AUTO: 160 10*3/UL (ref 130–400)
PMV BLD AUTO: 12.4 FL (ref 9.6–12.3)
POTASSIUM SERPL-SCNC: 3.8 MMOL/L (ref 3.5–5.1)
PROT SERPL-MCNC: 6.9 GM/DL (ref 6.4–8.2)
RBC # BLD AUTO: 5.26 10*6/UL (ref 4.1–5.1)
SODIUM SERPL-SCNC: 140 MMOL/L (ref 136–145)
WBC NRBC COR # BLD AUTO: 7.4 10*3/UL (ref 4.8–10.8)

## 2022-01-03 VITALS — DIASTOLIC BLOOD PRESSURE: 58 MMHG

## 2022-01-03 VITALS — DIASTOLIC BLOOD PRESSURE: 46 MMHG

## 2022-01-03 VITALS — SYSTOLIC BLOOD PRESSURE: 154 MMHG | DIASTOLIC BLOOD PRESSURE: 62 MMHG

## 2022-01-03 VITALS — DIASTOLIC BLOOD PRESSURE: 70 MMHG

## 2022-01-03 VITALS — DIASTOLIC BLOOD PRESSURE: 62 MMHG

## 2022-01-03 LAB
25(OH)D3 SERPL-MCNC: 26.6 NG/ML (ref 30–100)
ALBUMIN SERPL-MCNC: 3.1 GM/DL (ref 3.1–4.5)
ALP SERPL-CCNC: 126 U/L (ref 45–117)
ALT SERPL W P-5'-P-CCNC: 64 U/L (ref 12–78)
APTT PPP: 39.4 SECONDS (ref 20–32.1)
AST SERPL-CCNC: 62 IU/L (ref 3–35)
BASOPHILS # BLD AUTO: 0 10*3/UL (ref 0–0.1)
BASOPHILS NFR BLD AUTO: 0.1 % (ref 0–1)
BUN SERPL-MCNC: 18 MG/DL (ref 7–24)
CHLORIDE SERPL-SCNC: 104 MMOL/L (ref 98–107)
CHOLEST SERPL-MCNC: 173 MG/DL (ref ?–200)
CREAT SERPL-MCNC: 1.04 MG/DL (ref 0.55–1.02)
EOSINOPHIL # BLD AUTO: 0 % (ref 1–4)
EOSINOPHIL # BLD AUTO: 0 10*3/UL (ref 0–0.4)
ERYTHROCYTE [DISTWIDTH] IN BLOOD BY AUTOMATED COUNT: 13.6 % (ref 0–14.5)
GLUCOSE UR QL: (no result)
HCT VFR BLD AUTO: 45 % (ref 37–47)
INR BLD: 2.5 (ref 2–3.5)
LDLC SERPL DIRECT ASSAY-MCNC: 90 MG/DL (ref 9–159)
LYMPHOCYTES # BLD AUTO: 0.6 10*3/UL (ref 1.3–4.4)
LYMPHOCYTES NFR BLD AUTO: 5.7 % (ref 27–41)
MCH RBC QN AUTO: 27.7 PG (ref 27–31)
MCHC RBC AUTO-ENTMCNC: 32 G/DL (ref 33–37)
MCV RBC AUTO: 86.5 FL (ref 81–99)
MONOCYTES # BLD AUTO: 0.7 10*3/UL (ref 0.1–1)
MONOCYTES NFR BLD MANUAL: 7 % (ref 3–9)
NEUT #: 8.6 10*3/UL (ref 2.3–7.9)
NEUT %: 86.6 % (ref 47–73)
NRBC BLD QL AUTO: 0 10*3/UL (ref 0–0)
PH UR STRIP: 8 [PH] (ref 4.5–8)
PLATELET # BLD AUTO: 168 10*3/UL (ref 130–400)
PMV BLD AUTO: 12.4 FL (ref 9.6–12.3)
POTASSIUM SERPL-SCNC: 5 MMOL/L (ref 3.5–5.1)
PROT SERPL-MCNC: 7.2 GM/DL (ref 6.4–8.2)
RBC # BLD AUTO: 5.2 10*6/UL (ref 4.1–5.1)
SODIUM SERPL-SCNC: 139 MMOL/L (ref 136–145)
SP GR UR: 1.02 (ref 1–1.03)
T4 FREE SERPL-MCNC: 0.94 NG/DL (ref 0.76–1.46)
TRIGL SERPL-MCNC: 90 MG/DL (ref ?–150)
TSH SERPL DL<=0.005 MIU/L-ACNC: 3 UIU/ML (ref 0.36–4.75)
UROBILINOGEN UR STRIP-MCNC: 1 E.U./DL (ref 0–1)
WBC NRBC COR # BLD AUTO: 10 10*3/UL (ref 4.8–10.8)

## 2022-01-04 VITALS — DIASTOLIC BLOOD PRESSURE: 62 MMHG | SYSTOLIC BLOOD PRESSURE: 139 MMHG

## 2022-01-04 VITALS — DIASTOLIC BLOOD PRESSURE: 56 MMHG | SYSTOLIC BLOOD PRESSURE: 179 MMHG

## 2022-01-04 VITALS — DIASTOLIC BLOOD PRESSURE: 70 MMHG

## 2022-01-04 VITALS — DIASTOLIC BLOOD PRESSURE: 59 MMHG

## 2022-01-04 VITALS — DIASTOLIC BLOOD PRESSURE: 61 MMHG

## 2022-01-04 LAB
BASOPHILS # BLD AUTO: 0 10*3/UL (ref 0–0.1)
BASOPHILS NFR BLD AUTO: 0.2 % (ref 0–1)
BUN SERPL-MCNC: 21 MG/DL (ref 7–24)
CHLORIDE SERPL-SCNC: 97 MMOL/L (ref 98–107)
CREAT SERPL-MCNC: 1.12 MG/DL (ref 0.55–1.02)
EOSINOPHIL # BLD AUTO: 0 10*3/UL (ref 0–0.4)
EOSINOPHIL # BLD AUTO: 0.3 % (ref 1–4)
ERYTHROCYTE [DISTWIDTH] IN BLOOD BY AUTOMATED COUNT: 14 % (ref 0–14.5)
HCT VFR BLD AUTO: 43.6 % (ref 37–47)
LYMPHOCYTES # BLD AUTO: 0.6 10*3/UL (ref 1.3–4.4)
LYMPHOCYTES NFR BLD AUTO: 6.2 % (ref 27–41)
MCH RBC QN AUTO: 27.4 PG (ref 27–31)
MCHC RBC AUTO-ENTMCNC: 31.7 G/DL (ref 33–37)
MCV RBC AUTO: 86.7 FL (ref 81–99)
MONOCYTES # BLD AUTO: 0.8 10*3/UL (ref 0.1–1)
MONOCYTES NFR BLD MANUAL: 8 % (ref 3–9)
NEUT #: 8.7 10*3/UL (ref 2.3–7.9)
NEUT %: 84.1 % (ref 47–73)
NRBC BLD QL AUTO: 0 % (ref 0–0)
PLATELET # BLD AUTO: 176 10*3/UL (ref 130–400)
PMV BLD AUTO: 12.3 FL (ref 9.6–12.3)
POTASSIUM SERPL-SCNC: 5.1 MMOL/L (ref 3.5–5.1)
RBC # BLD AUTO: 5.03 10*6/UL (ref 4.1–5.1)
SODIUM SERPL-SCNC: 133 MMOL/L (ref 136–145)
WBC NRBC COR # BLD AUTO: 10.3 10*3/UL (ref 4.8–10.8)

## 2022-01-05 VITALS — DIASTOLIC BLOOD PRESSURE: 63 MMHG | SYSTOLIC BLOOD PRESSURE: 157 MMHG

## 2022-01-05 VITALS — DIASTOLIC BLOOD PRESSURE: 63 MMHG

## 2022-01-05 VITALS — DIASTOLIC BLOOD PRESSURE: 68 MMHG

## 2022-01-05 DIAGNOSIS — R55 SYNCOPE AND COLLAPSE: Primary | ICD-10-CM

## 2022-01-28 DIAGNOSIS — R55 SYNCOPE AND COLLAPSE: ICD-10-CM

## 2022-02-07 ENCOUNTER — TELEPHONE (OUTPATIENT)
Dept: CARDIOLOGY CLINIC | Age: 67
End: 2022-02-07

## 2022-02-07 NOTE — TELEPHONE ENCOUNTER
Left message for patient to contact office. ----- Message from Bradley Tuttle MD sent at 2/3/2022 12:17 PM EST -----  No dangerous abnormal heart rhythm. We will discuss in detail during follow-up visit. If patient is having symptoms to be seen sooner.

## 2022-02-11 NOTE — TELEPHONE ENCOUNTER
Patient notified of monitor results, she would like to be seen at the [address] [place] [address]. Marti Florian can you please call patient to schedule.

## 2022-03-06 ENCOUNTER — HOSPITAL ENCOUNTER (EMERGENCY)
Dept: HOSPITAL 83 - ED | Age: 67
Discharge: HOME | End: 2022-03-06
Payer: COMMERCIAL

## 2022-03-06 VITALS — HEIGHT: 55 IN

## 2022-03-06 DIAGNOSIS — Z98.890: ICD-10-CM

## 2022-03-06 DIAGNOSIS — E78.5: ICD-10-CM

## 2022-03-06 DIAGNOSIS — S80.02XA: Primary | ICD-10-CM

## 2022-03-06 DIAGNOSIS — E03.9: ICD-10-CM

## 2022-03-06 DIAGNOSIS — E11.22: ICD-10-CM
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
## 2022-03-06 DIAGNOSIS — E66.01: ICD-10-CM

## 2022-03-06 DIAGNOSIS — N18.9: ICD-10-CM

## 2022-03-06 DIAGNOSIS — Z88.8: ICD-10-CM

## 2022-03-06 DIAGNOSIS — Z90.49: ICD-10-CM
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
## 2022-03-06 DIAGNOSIS — Z79.899: ICD-10-CM

## 2022-03-06 DIAGNOSIS — I25.2: ICD-10-CM

## 2022-03-06 DIAGNOSIS — I12.9: ICD-10-CM
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
## 2022-03-06 DIAGNOSIS — I25.10: ICD-10-CM
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
## 2022-03-06 LAB
APTT PPP: 46.1 SECONDS (ref 20–32.1)
BASOPHILS # BLD AUTO: 0 10*3/UL (ref 0–0.1)
BASOPHILS NFR BLD AUTO: 0.5 % (ref 0–1)
BUN SERPL-MCNC: 17 MG/DL (ref 7–24)
CHLORIDE SERPL-SCNC: 107 MMOL/L (ref 98–107)
CREAT SERPL-MCNC: 1.04 MG/DL (ref 0.55–1.02)
EOSINOPHIL # BLD AUTO: 0.1 10*3/UL (ref 0–0.4)
EOSINOPHIL # BLD AUTO: 1.3 % (ref 1–4)
ERYTHROCYTE [DISTWIDTH] IN BLOOD BY AUTOMATED COUNT: 14.3 % (ref 0–14.5)
HCT VFR BLD AUTO: 37.6 % (ref 37–47)
INR BLD: 5.1 (ref 2–3.5)
LYMPHOCYTES # BLD AUTO: 0.7 10*3/UL (ref 1.3–4.4)
LYMPHOCYTES NFR BLD AUTO: 10.9 % (ref 27–41)
MCH RBC QN AUTO: 27.8 PG (ref 27–31)
MCHC RBC AUTO-ENTMCNC: 31.9 G/DL (ref 33–37)
MCV RBC AUTO: 87.2 FL (ref 81–99)
MONOCYTES # BLD AUTO: 0.5 10*3/UL (ref 0.1–1)
MONOCYTES NFR BLD MANUAL: 8.4 % (ref 3–9)
NEUT #: 4.8 10*3/UL (ref 2.3–7.9)
NEUT %: 78.6 % (ref 47–73)
NRBC BLD QL AUTO: 0 10*3/UL (ref 0–0)
PLATELET # BLD AUTO: 184 10*3/UL (ref 130–400)
PMV BLD AUTO: 11.2 FL (ref 9.6–12.3)
POTASSIUM SERPL-SCNC: 4.7 MMOL/L (ref 3.5–5.1)
RBC # BLD AUTO: 4.31 10*6/UL (ref 4.1–5.1)
SODIUM SERPL-SCNC: 139 MMOL/L (ref 136–145)
WBC NRBC COR # BLD AUTO: 6.1 10*3/UL (ref 4.8–10.8)

## 2022-03-08 ENCOUNTER — HOSPITAL ENCOUNTER (OUTPATIENT)
Dept: HOSPITAL 83 - LAB | Age: 67
Discharge: HOME | End: 2022-03-08
Attending: STUDENT IN AN ORGANIZED HEALTH CARE EDUCATION/TRAINING PROGRAM
Payer: COMMERCIAL

## 2022-03-08 DIAGNOSIS — Z79.01: Primary | ICD-10-CM

## 2022-03-08 LAB — INR BLD: 3.6 (ref 2–3.5)

## 2022-03-09 ENCOUNTER — HOSPITAL ENCOUNTER (OUTPATIENT)
Dept: HOSPITAL 83 - RESCLI | Age: 67
Discharge: HOME | End: 2022-03-09
Attending: STUDENT IN AN ORGANIZED HEALTH CARE EDUCATION/TRAINING PROGRAM
Payer: COMMERCIAL
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
## 2022-03-09 DIAGNOSIS — R79.1: ICD-10-CM
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
## 2022-03-09 DIAGNOSIS — S81.802A: Primary | ICD-10-CM
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
## 2022-03-10 ENCOUNTER — HOSPITAL ENCOUNTER (OUTPATIENT)
Dept: HOSPITAL 83 - LAB | Age: 67
Discharge: HOME | End: 2022-03-10
Attending: INTERNAL MEDICINE
Payer: COMMERCIAL

## 2022-03-10 DIAGNOSIS — R79.1: Primary | ICD-10-CM

## 2022-03-10 LAB — INR BLD: 1.6 (ref 2–3.5)

## 2022-03-14 ENCOUNTER — HOSPITAL ENCOUNTER (OUTPATIENT)
Dept: HOSPITAL 83 - LAB | Age: 67
Discharge: HOME | End: 2022-03-14
Attending: INTERNAL MEDICINE
Payer: COMMERCIAL

## 2022-03-14 DIAGNOSIS — Z94.0: ICD-10-CM

## 2022-03-14 DIAGNOSIS — E55.9: ICD-10-CM

## 2022-03-14 DIAGNOSIS — Z79.899: ICD-10-CM

## 2022-03-14 DIAGNOSIS — I82.91: Primary | ICD-10-CM

## 2022-03-14 DIAGNOSIS — I48.0: ICD-10-CM

## 2022-03-14 LAB
25(OH)D3 SERPL-MCNC: 26.8 NG/ML (ref 30–100)
BACTERIA #/AREA URNS HPF: (no result) /[HPF]
BASOPHILS # BLD AUTO: 0 10*3/UL (ref 0–0.1)
BASOPHILS NFR BLD AUTO: 0.5 % (ref 0–1)
BUN SERPL-MCNC: 22 MG/DL (ref 7–24)
CHLORIDE SERPL-SCNC: 105 MMOL/L (ref 98–107)
CREAT SERPL-MCNC: 1.1 MG/DL (ref 0.55–1.02)
EOSINOPHIL # BLD AUTO: 0.1 10*3/UL (ref 0–0.4)
EOSINOPHIL # BLD AUTO: 1.6 % (ref 1–4)
EPI CELLS #/AREA URNS HPF: (no result) /[HPF]
ERYTHROCYTE [DISTWIDTH] IN BLOOD BY AUTOMATED COUNT: 14.6 % (ref 0–14.5)
HCT VFR BLD AUTO: 42.1 % (ref 37–47)
INR BLD: 1.1 (ref 2–3.5)
LEUKOCYTE ESTERASE UR QL STRIP: (no result)
LYMPHOCYTES # BLD AUTO: 1.3 10*3/UL (ref 1.3–4.4)
LYMPHOCYTES NFR BLD AUTO: 17.1 % (ref 27–41)
MCH RBC QN AUTO: 28.5 PG (ref 27–31)
MCHC RBC AUTO-ENTMCNC: 31.6 G/DL (ref 33–37)
MCV RBC AUTO: 90.3 FL (ref 81–99)
MONOCYTES # BLD AUTO: 0.7 10*3/UL (ref 0.1–1)
MONOCYTES NFR BLD MANUAL: 9.1 % (ref 3–9)
NEUT #: 5.2 10*3/UL (ref 2.3–7.9)
NEUT %: 71.4 % (ref 47–73)
NRBC BLD QL AUTO: 0 10*3/UL (ref 0–0)
PH UR STRIP: 5 [PH] (ref 4.5–8)
PLATELET # BLD AUTO: 253 10*3/UL (ref 130–400)
PMV BLD AUTO: 11.7 FL (ref 9.6–12.3)
POTASSIUM SERPL-SCNC: 4.5 MMOL/L (ref 3.5–5.1)
RBC # BLD AUTO: 4.66 10*6/UL (ref 4.1–5.1)
SODIUM SERPL-SCNC: 139 MMOL/L (ref 136–145)
SP GR UR: >= 1.03 (ref 1–1.03)
URATE SERPL-MCNC: 4.7 MG/DL (ref 2.6–6)
UROBILINOGEN UR STRIP-MCNC: 1 E.U./DL (ref 0–1)
WBC #/AREA URNS HPF: (no result) WBC/HPF (ref 0–5)
WBC NRBC COR # BLD AUTO: 7.3 10*3/UL (ref 4.8–10.8)

## 2022-03-18 ENCOUNTER — HOSPITAL ENCOUNTER (OUTPATIENT)
Dept: HOSPITAL 83 - LAB | Age: 67
Discharge: HOME | End: 2022-03-18
Attending: INTERNAL MEDICINE
Payer: COMMERCIAL

## 2022-03-18 DIAGNOSIS — Z79.01: Primary | ICD-10-CM

## 2022-03-18 LAB — INR BLD: 1.2 (ref 2–3.5)

## 2022-03-22 ENCOUNTER — HOSPITAL ENCOUNTER (OUTPATIENT)
Dept: HOSPITAL 83 - LAB | Age: 67
Discharge: HOME | End: 2022-03-22
Attending: EMERGENCY MEDICINE
Payer: COMMERCIAL

## 2022-03-22 DIAGNOSIS — Z79.01: Primary | ICD-10-CM

## 2022-03-22 LAB — INR BLD: 2.1 (ref 2–3.5)

## 2022-03-25 ENCOUNTER — HOSPITAL ENCOUNTER (OUTPATIENT)
Dept: HOSPITAL 83 - LAB | Age: 67
Discharge: HOME | End: 2022-03-25
Attending: INTERNAL MEDICINE
Payer: COMMERCIAL

## 2022-03-25 DIAGNOSIS — Z79.01: Primary | ICD-10-CM

## 2022-03-25 LAB — INR BLD: 2.9 (ref 2–3.5)

## 2022-03-28 ENCOUNTER — HOSPITAL ENCOUNTER (OUTPATIENT)
Dept: HOSPITAL 83 - LAB | Age: 67
Discharge: HOME | End: 2022-03-28
Attending: STUDENT IN AN ORGANIZED HEALTH CARE EDUCATION/TRAINING PROGRAM
Payer: COMMERCIAL

## 2022-03-28 DIAGNOSIS — I82.532: Primary | ICD-10-CM

## 2022-03-28 LAB — INR BLD: 3.9 (ref 2–3.5)

## 2022-03-30 ENCOUNTER — HOSPITAL ENCOUNTER (OUTPATIENT)
Dept: HOSPITAL 83 - RESCLI | Age: 67
Discharge: HOME | End: 2022-03-30
Attending: STUDENT IN AN ORGANIZED HEALTH CARE EDUCATION/TRAINING PROGRAM
Payer: COMMERCIAL

## 2022-03-30 DIAGNOSIS — E78.5: ICD-10-CM

## 2022-03-30 DIAGNOSIS — Z79.899: ICD-10-CM

## 2022-03-30 DIAGNOSIS — Z98.890: ICD-10-CM

## 2022-03-30 DIAGNOSIS — E03.9: ICD-10-CM

## 2022-03-30 DIAGNOSIS — I10: Primary | ICD-10-CM

## 2022-03-30 DIAGNOSIS — Z88.8: ICD-10-CM

## 2022-03-30 DIAGNOSIS — I25.10: ICD-10-CM

## 2022-03-30 DIAGNOSIS — K59.00: ICD-10-CM

## 2022-03-30 DIAGNOSIS — Z79.82: ICD-10-CM

## 2022-03-30 DIAGNOSIS — F32.89: ICD-10-CM

## 2022-03-30 DIAGNOSIS — E11.9: ICD-10-CM

## 2022-03-30 DIAGNOSIS — I48.0: ICD-10-CM

## 2022-03-30 DIAGNOSIS — Z79.01: ICD-10-CM

## 2022-03-30 DIAGNOSIS — Z90.49: ICD-10-CM

## 2022-03-31 ENCOUNTER — HOSPITAL ENCOUNTER (OUTPATIENT)
Dept: HOSPITAL 83 - LAB | Age: 67
Discharge: HOME | End: 2022-03-31
Attending: INTERNAL MEDICINE
Payer: COMMERCIAL

## 2022-03-31 DIAGNOSIS — Z79.01: Primary | ICD-10-CM

## 2022-03-31 LAB — INR BLD: 1.9 (ref 2–3.5)

## 2022-04-05 ENCOUNTER — HOSPITAL ENCOUNTER (OUTPATIENT)
Dept: HOSPITAL 83 - LAB | Age: 67
Discharge: HOME | End: 2022-04-05
Attending: EMERGENCY MEDICINE
Payer: COMMERCIAL

## 2022-04-05 DIAGNOSIS — Z79.01: Primary | ICD-10-CM

## 2022-04-05 LAB — INR BLD: 1.7 (ref 2–3.5)

## 2022-04-12 ENCOUNTER — HOSPITAL ENCOUNTER (OUTPATIENT)
Dept: HOSPITAL 83 - LAB | Age: 67
Discharge: HOME | End: 2022-04-12
Attending: INTERNAL MEDICINE
Payer: COMMERCIAL

## 2022-04-12 DIAGNOSIS — Z79.01: Primary | ICD-10-CM

## 2022-04-12 LAB — INR BLD: 2.7 (ref 2–3.5)

## 2022-04-19 ENCOUNTER — HOSPITAL ENCOUNTER (OUTPATIENT)
Dept: HOSPITAL 83 - LAB | Age: 67
Discharge: HOME | End: 2022-04-19
Attending: STUDENT IN AN ORGANIZED HEALTH CARE EDUCATION/TRAINING PROGRAM
Payer: COMMERCIAL

## 2022-04-19 DIAGNOSIS — Z79.01: Primary | ICD-10-CM

## 2022-04-19 LAB — INR BLD: 2.7 (ref 2–3.5)

## 2022-04-27 ENCOUNTER — HOSPITAL ENCOUNTER (OUTPATIENT)
Dept: HOSPITAL 83 - RESCLI | Age: 67
Discharge: HOME | End: 2022-04-27
Attending: STUDENT IN AN ORGANIZED HEALTH CARE EDUCATION/TRAINING PROGRAM
Payer: COMMERCIAL

## 2022-04-27 DIAGNOSIS — I25.10: ICD-10-CM

## 2022-04-27 DIAGNOSIS — E11.9: ICD-10-CM

## 2022-04-27 DIAGNOSIS — K59.00: ICD-10-CM

## 2022-04-27 DIAGNOSIS — E78.5: ICD-10-CM

## 2022-04-27 DIAGNOSIS — I50.32: ICD-10-CM

## 2022-04-27 DIAGNOSIS — Z79.82: ICD-10-CM

## 2022-04-27 DIAGNOSIS — Z79.899: ICD-10-CM

## 2022-04-27 DIAGNOSIS — I48.0: ICD-10-CM

## 2022-04-27 DIAGNOSIS — Z79.01: Primary | ICD-10-CM

## 2022-04-27 DIAGNOSIS — I11.0: ICD-10-CM

## 2022-04-27 DIAGNOSIS — F32.89: ICD-10-CM

## 2022-04-27 DIAGNOSIS — Z94.0: ICD-10-CM

## 2022-04-27 DIAGNOSIS — Z88.8: ICD-10-CM

## 2022-04-27 LAB — INR BLD: 3.1 (ref 2–3.5)

## 2022-05-02 ENCOUNTER — HOSPITAL ENCOUNTER (OUTPATIENT)
Dept: HOSPITAL 83 - LAB | Age: 67
Discharge: HOME | End: 2022-05-02
Attending: STUDENT IN AN ORGANIZED HEALTH CARE EDUCATION/TRAINING PROGRAM
Payer: COMMERCIAL

## 2022-05-02 DIAGNOSIS — Z79.01: Primary | ICD-10-CM

## 2022-05-02 LAB — INR BLD: 1.8 (ref 2–3.5)

## 2022-05-05 ENCOUNTER — OFFICE VISIT (OUTPATIENT)
Dept: CARDIOLOGY CLINIC | Age: 67
End: 2022-05-05
Payer: MEDICARE

## 2022-05-05 ENCOUNTER — HOSPITAL ENCOUNTER (OUTPATIENT)
Dept: HOSPITAL 83 - LAB | Age: 67
Discharge: HOME | End: 2022-05-05
Attending: STUDENT IN AN ORGANIZED HEALTH CARE EDUCATION/TRAINING PROGRAM
Payer: COMMERCIAL

## 2022-05-05 VITALS
BODY MASS INDEX: 35.2 KG/M2 | DIASTOLIC BLOOD PRESSURE: 76 MMHG | HEIGHT: 66 IN | HEART RATE: 68 BPM | RESPIRATION RATE: 18 BRPM | SYSTOLIC BLOOD PRESSURE: 158 MMHG | WEIGHT: 219 LBS

## 2022-05-05 DIAGNOSIS — Q87.81 ALPORT SYNDROME: ICD-10-CM

## 2022-05-05 DIAGNOSIS — R06.09 DOE (DYSPNEA ON EXERTION): ICD-10-CM

## 2022-05-05 DIAGNOSIS — I48.0 PAROXYSMAL ATRIAL FIBRILLATION (HCC): Primary | ICD-10-CM

## 2022-05-05 DIAGNOSIS — Z95.828 PRESENCE OF IVC FILTER: ICD-10-CM

## 2022-05-05 DIAGNOSIS — Z94.0 KIDNEY TRANSPLANT RECIPIENT: ICD-10-CM

## 2022-05-05 DIAGNOSIS — E11.9 TYPE 2 DIABETES MELLITUS WITHOUT COMPLICATION, WITHOUT LONG-TERM CURRENT USE OF INSULIN (HCC): ICD-10-CM

## 2022-05-05 DIAGNOSIS — E78.5 DYSLIPIDEMIA: ICD-10-CM

## 2022-05-05 DIAGNOSIS — I25.10 CORONARY ARTERY DISEASE INVOLVING NATIVE CORONARY ARTERY OF NATIVE HEART WITHOUT ANGINA PECTORIS: ICD-10-CM

## 2022-05-05 DIAGNOSIS — Z79.01: Primary | ICD-10-CM

## 2022-05-05 DIAGNOSIS — R07.89 OTHER CHEST PAIN: ICD-10-CM

## 2022-05-05 DIAGNOSIS — I25.810 ARTERIOSCLEROSIS OF ARTERIAL CORONARY ARTERY BYPASS GRAFT: ICD-10-CM

## 2022-05-05 DIAGNOSIS — I10 ESSENTIAL HYPERTENSION: ICD-10-CM

## 2022-05-05 DIAGNOSIS — O22.30 DVT (DEEP VEIN THROMBOSIS) IN PREGNANCY: ICD-10-CM
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
## 2022-05-05 PROBLEM — H52.223 REGULAR ASTIGMATISM, BILATERAL: Status: ACTIVE | Noted: 2022-05-05

## 2022-05-05 PROBLEM — H52.4 PRESBYOPIA: Status: ACTIVE | Noted: 2022-05-05

## 2022-05-05 PROBLEM — H25.10 AGE-RELATED NUCLEAR CATARACT: Status: ACTIVE | Noted: 2022-05-05

## 2022-05-05 LAB — INR BLD: 2.5 (ref 2–3.5)

## 2022-05-05 PROCEDURE — G8417 CALC BMI ABV UP PARAM F/U: HCPCS | Performed by: INTERNAL MEDICINE

## 2022-05-05 PROCEDURE — G8427 DOCREV CUR MEDS BY ELIG CLIN: HCPCS | Performed by: INTERNAL MEDICINE

## 2022-05-05 PROCEDURE — 3046F HEMOGLOBIN A1C LEVEL >9.0%: CPT | Performed by: INTERNAL MEDICINE

## 2022-05-05 PROCEDURE — 1090F PRES/ABSN URINE INCON ASSESS: CPT | Performed by: INTERNAL MEDICINE

## 2022-05-05 PROCEDURE — 1036F TOBACCO NON-USER: CPT | Performed by: INTERNAL MEDICINE

## 2022-05-05 PROCEDURE — 1123F ACP DISCUSS/DSCN MKR DOCD: CPT | Performed by: INTERNAL MEDICINE

## 2022-05-05 PROCEDURE — G8400 PT W/DXA NO RESULTS DOC: HCPCS | Performed by: INTERNAL MEDICINE

## 2022-05-05 PROCEDURE — 2022F DILAT RTA XM EVC RTNOPTHY: CPT | Performed by: INTERNAL MEDICINE

## 2022-05-05 PROCEDURE — 93000 ELECTROCARDIOGRAM COMPLETE: CPT | Performed by: INTERNAL MEDICINE

## 2022-05-05 PROCEDURE — 99212 OFFICE O/P EST SF 10 MIN: CPT | Performed by: INTERNAL MEDICINE

## 2022-05-05 PROCEDURE — 4040F PNEUMOC VAC/ADMIN/RCVD: CPT | Performed by: INTERNAL MEDICINE

## 2022-05-05 PROCEDURE — 3017F COLORECTAL CA SCREEN DOC REV: CPT | Performed by: INTERNAL MEDICINE

## 2022-05-05 RX ORDER — INSULIN DETEMIR 100 [IU]/ML
INJECTION, SOLUTION SUBCUTANEOUS
COMMUNITY
Start: 2022-04-20

## 2022-05-05 RX ORDER — AMIODARONE HYDROCHLORIDE 200 MG/1
1 TABLET ORAL DAILY
COMMUNITY
Start: 2022-03-18

## 2022-05-05 RX ORDER — SENNOSIDES AND DOCUSATE SODIUM 8.6; 5 MG/1; MG/1
TABLET, FILM COATED ORAL PRN
COMMUNITY
Start: 2022-03-18

## 2022-05-05 RX ORDER — ATORVASTATIN CALCIUM 40 MG/1
1 TABLET, FILM COATED ORAL DAILY
COMMUNITY
Start: 2022-03-18

## 2022-05-05 RX ORDER — CARVEDILOL 3.12 MG/1
3.12 TABLET ORAL 2 TIMES DAILY
Qty: 60 TABLET | Refills: 3 | Status: SHIPPED | OUTPATIENT
Start: 2022-05-05

## 2022-05-05 RX ORDER — INSULIN ASPART 100 [IU]/ML
INJECTION, SOLUTION INTRAVENOUS; SUBCUTANEOUS
COMMUNITY
Start: 2022-03-18

## 2022-05-05 RX ORDER — CLOPIDOGREL BISULFATE 75 MG/1
1 TABLET ORAL DAILY
COMMUNITY
Start: 2022-03-18 | End: 2022-05-05 | Stop reason: ALTCHOICE

## 2022-05-05 RX ORDER — VERAPAMIL HYDROCHLORIDE 120 MG/1
1 CAPSULE, EXTENDED RELEASE ORAL DAILY
COMMUNITY
Start: 2022-04-24

## 2022-05-05 RX ORDER — LEVOTHYROXINE SODIUM 25 UG/1
TABLET ORAL
COMMUNITY
Start: 2022-04-02

## 2022-05-05 RX ORDER — LISINOPRIL 2.5 MG/1
TABLET ORAL
COMMUNITY
Start: 2022-03-18

## 2022-05-05 RX ORDER — HYDROGEN PEROXIDE 2.65 ML/100ML
1 LIQUID ORAL; TOPICAL DAILY
COMMUNITY
Start: 2022-04-27

## 2022-05-05 RX ORDER — MYCOPHENOLATE MOFETIL 500 MG/1
2 TABLET ORAL 2 TIMES DAILY
COMMUNITY
Start: 2022-03-18

## 2022-05-05 RX ORDER — POTASSIUM CHLORIDE 20 MEQ/1
1 TABLET, EXTENDED RELEASE ORAL DAILY
COMMUNITY
Start: 2022-03-25

## 2022-05-05 RX ORDER — AMITRIPTYLINE HYDROCHLORIDE 10 MG/1
TABLET, FILM COATED ORAL
COMMUNITY
Start: 2022-03-18

## 2022-05-05 RX ORDER — SERTRALINE HYDROCHLORIDE 100 MG/1
1 TABLET, FILM COATED ORAL DAILY
COMMUNITY
Start: 2022-03-25

## 2022-05-05 RX ORDER — FUROSEMIDE 40 MG/1
1 TABLET ORAL DAILY
COMMUNITY
Start: 2022-03-18

## 2022-05-05 RX ORDER — MAGNESIUM OXIDE TAB 400 MG (241.3 MG ELEMENTAL MG) 400 (241.3 MG) MG
1 TAB ORAL 3 TIMES DAILY
COMMUNITY
Start: 2022-03-21

## 2022-05-05 RX ORDER — WARFARIN SODIUM 3 MG/1
2 TABLET ORAL DAILY
COMMUNITY
Start: 2022-04-19

## 2022-05-05 RX ORDER — TACROLIMUS 1 MG/1
1 CAPSULE ORAL 2 TIMES DAILY
COMMUNITY
Start: 2022-03-25

## 2022-05-05 NOTE — PROGRESS NOTES
Out Patient CARDIOLOGY FOLLOW UP    Name: Tom Figueroa    Age: 73 y.o. Date of Service: 5/5/2022      Referring Physician: No admitting provider for patient encounter. 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 Patient reports he had had a cardiac evaluation at [facility] or [facility] and was seen a cardiologist in [place] for management of coronary artery disease with coronary bypass graft and atrial fibrillation. She presented to establish care with cardiology and report has been having fatigue with activities, intermittent chest pain and occasionally requiring nitroglycerin and dyspnea on exertion. Lexiscan myocardial perfusion stress test and echocardiogram is arranged. Attempt to obtain her records from [place], [facility] and [facility]. He is on triple therapy (aspirin, Plavix and Coumadin) and I have discontinued the Plavix to avoid significant bleeding especially in a patient with recurrent falls. He is advised if he develops another fall we will refer the patient to structural heart to discuss watchman device evaluation. Blood pressure is not adequately controlled and subsequently low-dose carvedilol was added.       Review of Systems:   Cardiac: As per HPI  General: Denies fever or chills  Pulmonary: As per HPI  HEENT: Denies runny nose  GI: No complaints  : No complaints  Endocrine: Denies night sweats  Musculoskeletal: No complaints  Skin: Dry skin  Neuro: No complaints  Psych: Denies depression    Past Medical History:  Past Medical History:   Diagnosis Date    Chronic kidney disease     Diabetes mellitus ([facility] [address]     Hypertension     Thyroid disease        Past Surgical History:  Past Surgical History:   Procedure Laterality Date    CARDIAC SURGERY      cardiac bypass       Family History:  History reviewed. No pertinent family history. Social History:  Social History     Socioeconomic History    Marital status:      Spouse name: Not on file    Number of children: Not on file    Years of education: Not on file    Highest education level: Not on file   Occupational History    Not on file   Tobacco Use    Smoking status: Never Smoker    Smokeless tobacco: Never Used   Vaping Use    Vaping Use: Never used   Substance and Sexual Activity    Alcohol use: Not Currently    Drug use: Never    Sexual activity: Not on file   Other Topics Concern    Not on file   Social History Narrative    Not on file     Social Determinants of Health     Financial Resource Strain:     Difficulty of Paying Living Expenses: Not on file   Food Insecurity:     Worried About Running Out of Food in the Last Year: Not on file    Ilene of Food in the Last Year: Not on file   Transportation Needs:     Lack of Transportation (Medical): Not on file    Lack of Transportation (Non-Medical):  Not on file   Physical Activity:     Days of Exercise per Week: Not on file    Minutes of Exercise per Session: Not on file   Stress:     Feeling of Stress : Not on file   Social Connections:     Frequency of Communication with Friends and Family: Not on file    Frequency of Social Gatherings with Friends and Family: Not on file    Attends Sabianism Services: Not on file    Active Member of Clubs or Organizations: Not on file    Attends Club or Organization Meetings: Not on file    Marital Status: Not on file   Intimate Partner Violence:     Fear of Current or Ex-Partner: Not on file    Emotionally Abused: Not on file    Physically Abused: Not on file    Sexually Abused: Not on file   Housing Stability:     Unable to Pay for Housing in the Last Year: Not on file    Number of Places Lived in the Last Year: Not on file    Unstable Housing in the Last Year: Not on file       Allergies: Allergies   Allergen Reactions    Latex     Adhesive Tape     Hydralazine     Ibuprofen        Home Medications:  Prior to Admission medications    Medication Sig Start Date End Date Taking? Authorizing Provider   warfarin (COUMADIN) 3 MG tablet Take 1 tablet by mouth daily Indications: 4 mg Mon & Thurs, 3 mg other days.   4/19/22  Yes Historical Provider, MD   MAGNESIUM-OXIDE 400 (240 Mg) MG tablet Take 1 tablet by mouth in the morning, at noon, and at bedtime 3/21/22  Yes Historical Provider, MD   amiodarone (CORDARONE) 200 MG tablet Take 1 tablet by mouth daily 3/18/22  Yes Historical Provider, MD   amitriptyline (ELAVIL) 10 MG tablet TAKE 1 TABLET BY MOUTH AT BEDTIME FOR 90 DAYS 3/18/22  Yes Historical Provider, MD MONTANEZ ASPIRIN ADULT LOW DOSE 81 MG EC tablet Take 1 tablet by mouth daily 4/27/22  Yes Historical Provider, MD   atorvastatin (LIPITOR) 40 MG tablet Take 1 tablet by mouth daily 3/18/22  Yes Historical Provider, MD   furosemide (LASIX) 40 MG tablet Take 1 tablet by mouth daily 3/18/22  Yes Historical Provider, MD   NOVOLOG FLEXPEN 100 UNIT/ML injection pen INJECT SUBCUTANEOUSLY PER SLIDING SCALE, MAX DOSE 13 UNITS 3/18/22  Yes Historical Provider, MD   LEVEMIR FLEXTOUCH 100 UNIT/ML injection pen INJECT [facility] 4/20/22  Yes Historical Provider, MD   EUTHYROX 25 MCG tablet TAKE 1 TABLET BY MOUTH ONCE DAILY ON AN EMPTY STOMACH IN THE MORNING 4/2/22  Yes Historical Provider, MD   lisinopril (PRINIVIL;ZESTRIL) 2.5 MG tablet TAKE 1 TABLET BY MOUTH ONCE DAILY FOR 90 DAYS 3/18/22  Yes Historical Provider, MD   mycophenolate (CELLCEPT) 500 MG tablet Take 2 tablets by mouth in the morning and at bedtime 3/18/22  Yes Historical Provider, MD   potassium chloride (KLOR-CON M) 20 MEQ extended release tablet Take 1 tablet by mouth daily 3/25/22  Yes Historical Provider, MD DORSEY SENNA-S 8.6-50 MG per tablet as needed  3/18/22  Yes Historical Provider, MD   sertraline (ZOLOFT) 100 MG tablet Take 1 tablet by mouth daily 3/25/22  Yes Historical Provider, MD   tacrolimus (PROGRAF) 1 MG capsule Take 1 capsule by mouth in the morning and at bedtime 3/25/22  Yes Historical Provider, MD   verapamil (VERELAN) 120 MG extended release capsule Take 1 capsule by mouth daily 4/24/22  Yes Historical Provider, MD   carvedilol (COREG) 3.125 MG tablet Take 1 tablet by mouth 2 times daily 5/5/22  Yes Jose Fischer MD       Current Medications:  Current Outpatient Medications   Medication Sig Dispense Refill    warfarin (COUMADIN) 3 MG tablet Take 1 tablet by mouth daily Indications: 4 mg Mon & Thurs, 3 mg other days.        MAGNESIUM-OXIDE 400 (240 Mg) MG tablet Take 1 tablet by mouth in the morning, at noon, and at bedtime      amiodarone (CORDARONE) 200 MG tablet Take 1 tablet by mouth daily      amitriptyline (ELAVIL) 10 MG tablet TAKE 1 TABLET BY MOUTH AT BEDTIME FOR 90 DAYS      EQ ASPIRIN ADULT LOW DOSE 81 MG EC tablet Take 1 tablet by mouth daily      atorvastatin (LIPITOR) 40 MG tablet Take 1 tablet by mouth daily      furosemide (LASIX) 40 MG tablet Take 1 tablet by mouth daily      NOVOLOG FLEXPEN 100 UNIT/ML injection pen INJECT SUBCUTANEOUSLY PER SLIDING SCALE, MAX DOSE 13 UNITS      LEVEMIR FLEXTOUCH 100 UNIT/ML injection pen INJECT 30 UNITS SUBCUTANEOUSLY TWICE DAILY      EUTHYROX 25 MCG tablet TAKE 1 TABLET BY MOUTH ONCE DAILY ON AN EMPTY STOMACH IN THE MORNING      lisinopril (PRINIVIL;ZESTRIL) 2.5 MG tablet TAKE 1 TABLET BY MOUTH ONCE DAILY FOR 90 DAYS      mycophenolate (CELLCEPT) 500 MG tablet Take 2 tablets by mouth in the morning and at bedtime  potassium chloride (KLOR-CON M) 20 MEQ extended release tablet Take 1 tablet by mouth daily      SM SENNA-S 8.6-50 MG per tablet as needed       sertraline (ZOLOFT) 100 MG tablet Take 1 tablet by mouth daily      tacrolimus (PROGRAF) 1 MG capsule Take 1 capsule by mouth in the morning and at bedtime      verapamil (VERELAN) 120 MG extended release capsule Take 1 capsule by mouth daily      carvedilol (COREG) 3.125 MG tablet Take 1 tablet by mouth 2 times daily 60 tablet 3     Current Facility-Administered Medications   Medication Dose Route Frequency Provider Last Rate Last Admin    perflutren lipid microspheres (DEFINITY) injection 1.65 mg  1.5 mL IntraVENous ONCE PRN Armen Fischer MD        regadenoson (LEXISCAN) injection 0.4 mg  0.4 mg IntraVENous ONCE PRN Armen Fischer MD             Physical Exam:  BP (!) 158/76   Pulse 68   Resp 18   Ht 5' 5.5\" (1.664 m)   Wt 219 lb (99.3 kg)   BMI 35.89 kg/m²   Wt Readings from Last 3 Encounters:   05/05/22 219 lb (99.3 kg)       Appearance: Alert and oriented x3 not in acute distress. Skin: Dry skin  Head: Atraumatic  Eyes: Intact extraocular muscles   ENMT: Mucous membranes are moist  Neck: Supple  Lungs: Clear to auscultation  Cardiac: Normal S1 and S2  Abdomen: Protuberant  Extremities: Intact range of motion  Neurologic: No focal neurological deficits  Peripheral Pulses: 2+ peripheral pulses    Intake/Output:  No intake or output data in the 24 hours ending 05/05/22 1522  @[facility]@    Laboratory Tests:  No results for input(s): NA, K, CL, CO2, BUN, CREATININE, GLUCOSE, CALCIUM in the last 72 hours. Lab Results   Component Value Date    MG 1.8 07/19/2017    MG 1.8 06/26/2017    MG 1.8 05/09/2017     No results for input(s): ALKPHOS, ALT, AST, PROT, BILITOT, BILIDIR, LABALBU in the last 72 hours. No results for input(s): WBC, RBC, HGB, HCT, MCV, MCH, MCHC, RDW, PLT, MPV in the last 72 hours.   Lab Results   Component Value Date    CKMB 0.7 04/06/2017    TROPONINI 0.038 04/07/2017    TROPONINI < 0.015 04/06/2017    TROPONINI < 0.015 04/06/2017     No results for input(s): CKTOTAL, CKMB, CKMBINDEX, TROPHS in the last 72 hours. Lab Results   Component Value Date    INR 2.8 04/06/2017    INR 2.9 04/05/2017    INR 2.9 03/22/2017    PROTIME 31.5 (H) 04/06/2017    PROTIME 32.3 (H) 04/05/2017    PROTIME 32.9 (H) 03/22/2017     Lab Results   Component Value Date    TSH 1.740 04/06/2017    TSH 1.190 03/21/2017     Lab Results   Component Value Date    LABA1C 7.3 (H) 04/06/2017     Lab Results   Component Value Date     04/06/2017     Lab Results   Component Value Date    CHOL 153 07/19/2017    CHOL 163 06/26/2017    CHOL 171 05/09/2017     Lab Results   Component Value Date    TRIG 127 04/06/2017    TRIG 132 03/21/2017     Lab Results   Component Value Date    HDL 58 04/06/2017    HDL 57 03/21/2017     Lab Results   Component Value Date    LDLCHOLESTEROL 78 04/06/2017    LDLCHOLESTEROL 89 03/21/2017     Lab Results   Component Value Date    VLDL 25 04/06/2017    VLDL 26 03/21/2017     No results found for: CHOLHDLRATIO  No results for input(s): PROBNP in the last 72 hours. Cardiac Tests:  EKG reviewed (EKG date: This rhythm 68 bpm nonspecific ST-T wave changes and anterior septal Q waves.):        Echocardiogram reviewed:     Stress test reviewed:      Cardiac catheterization reviewed:       Zio patch heart monitor January 28, 2022:  Patient had a min HR of 37 bpm, max HR of 162 bpm, and avg HR of 74  bpm. Predominant underlying rhythm was Sinus Rhythm. First Degree AV  Block was present. 2 Supraventricular Tachycardia runs occurred, the run  with the fastest interval lasting 9 beats with a max rate of 162 bpm, the  longest lasting 8 beats with an avg rate of 96 bpm. Isolated SVEs were  rare (<1.0%), SVE Couplets were rare (<1.0%), and SVE Triplets were rare  (<1.0%).  Isolated VEs were rare (<1.0%), VE Couplets were rare (<1.0%),  and no VE Triplets were present. CXR reviewed: The ASCVD Risk score (Marycruz Jean Baptiste, et al., 2013) failed to calculate for the following reasons:    Cannot find a previous HDL lab    Cannot find a previous total cholesterol lab    ASSESSMENT / PLAN:    1. Paroxysmal atrial fibrillation (HCC)  On amiodarone and Coumadin as well as beta-blocker  Plavix was discontinued to avoid triple therapy  Follow-up with your primary care physician for Coumadin management  - NM Cardiac Stress Test Nuclear Imaging; Future  - Cardiac Stress Test - w/Pharm; Future  - ECHO COMPLETE; Future    2. Other chest pain  - NM Cardiac Stress Test Nuclear Imaging; Future  - Cardiac Stress Test - w/Pharm; Future  - ECHO COMPLETE; Future    3. PERALTA (dyspnea on exertion)  - NM Cardiac Stress Test Nuclear Imaging; Future  - Cardiac Stress Test - w/Pharm; Future  - ECHO COMPLETE; Future    4. Coronary artery disease involving native coronary artery of native heart without angina pectoris  He report he had CABG long time ago and we will attempt to request the records from [facility] or [address] [place] [address] on beta-blocker, aspirin and statin therapy  5. Arteriosclerosis of arterial coronary artery bypass graft    6. Essential hypertension  Low-dose carvedilol was added due to uncontrolled blood pressure    7. Type 2 diabetes mellitus without complication, without long-term current use of insulin ([facility] [address]  Follow-up with your PCP  8. Dyslipidemia  Continue statin therapy  9. Fall, sequela  Fall precautions were also commended    10. Alport syndrome status post kidney transplant  Follow-up with urology and nephrology  11. Kidney transplant recipient  Follow-up with nephrology and transplant team  12. DVT (deep vein thrombosis) in pregnancy  On Coumadin follow-up with your PCP for Coumadin management  13. Presence of IVC filter             FOLLOW-UP in 3 months        Thank you for allowing me to participate in your patient's care. Please feel free to contact me if you have any questions or concerns.     Merry Vuong MD  [facility] ([facility]) Cardiology

## 2022-05-23 ENCOUNTER — HOSPITAL ENCOUNTER (OUTPATIENT)
Dept: HOSPITAL 83 - CARD | Age: 67
Discharge: HOME | End: 2022-05-23
Attending: INTERNAL MEDICINE
Payer: COMMERCIAL

## 2022-05-23 DIAGNOSIS — R94.31: Primary | ICD-10-CM
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
## 2022-05-24 DIAGNOSIS — R07.89 OTHER CHEST PAIN: ICD-10-CM

## 2022-05-24 DIAGNOSIS — I48.0 PAROXYSMAL ATRIAL FIBRILLATION (HCC): ICD-10-CM

## 2022-05-24 DIAGNOSIS — R06.09 DOE (DYSPNEA ON EXERTION): ICD-10-CM

## 2022-06-07 ENCOUNTER — TELEPHONE (OUTPATIENT)
Dept: CARDIOLOGY CLINIC | Age: 67
End: 2022-06-07

## 2022-06-07 NOTE — TELEPHONE ENCOUNTER
----- Message from [address] [place] [address] sent at 6/3/2022  3:35 PM EDT -----    ----- Message -----  From: Hilda Qureshi MD  Sent: 6/2/2022   9:08 AM EDT  To: Key Borja    Stress test is abnormal.  Please arrange for invasive coronary angiogram for further evaluation.

## 2022-06-08 DIAGNOSIS — E11.69 TYPE 2 DIABETES MELLITUS WITH OTHER SPECIFIED COMPLICATION, UNSPECIFIED WHETHER LONG TERM INSULIN USE (HCC): ICD-10-CM

## 2022-06-08 DIAGNOSIS — I48.91 ATRIAL FIBRILLATION, UNSPECIFIED TYPE (HCC): ICD-10-CM

## 2022-06-08 DIAGNOSIS — R94.39 ABNORMAL STRESS TEST: Primary | ICD-10-CM

## 2022-06-08 NOTE — TELEPHONE ENCOUNTER
Please schedule cardiac catheterization for abnormal stress test and symptoms with AUC score of 9 and 7

## 2022-06-10 ENCOUNTER — HOSPITAL ENCOUNTER (OUTPATIENT)
Dept: HOSPITAL 83 - LAB | Age: 67
Discharge: HOME | End: 2022-06-10
Attending: STUDENT IN AN ORGANIZED HEALTH CARE EDUCATION/TRAINING PROGRAM
Payer: COMMERCIAL

## 2022-06-10 DIAGNOSIS — I48.91: ICD-10-CM

## 2022-06-10 DIAGNOSIS — E11.69: Primary | ICD-10-CM

## 2022-06-10 DIAGNOSIS — R94.39: ICD-10-CM

## 2022-06-10 LAB
ALP SERPL-CCNC: 134 U/L (ref 45–117)
ALT SERPL W P-5'-P-CCNC: 44 U/L (ref 12–78)
AST SERPL-CCNC: 35 IU/L (ref 3–35)
BASOPHILS # BLD AUTO: 0 10*3/UL (ref 0–0.1)
BASOPHILS NFR BLD AUTO: 0.4 % (ref 0–1)
BUN SERPL-MCNC: 21 MG/DL (ref 7–24)
CHLORIDE SERPL-SCNC: 104 MMOL/L (ref 98–107)
CREAT SERPL-MCNC: 1.29 MG/DL (ref 0.55–1.02)
EOSINOPHIL # BLD AUTO: 0.1 10*3/UL (ref 0–0.4)
EOSINOPHIL # BLD AUTO: 1.5 % (ref 1–4)
ERYTHROCYTE [DISTWIDTH] IN BLOOD BY AUTOMATED COUNT: 13.5 % (ref 0–14.5)
HCT VFR BLD AUTO: 43.3 % (ref 37–47)
INR BLD: 4 (ref 2–3.5)
LYMPHOCYTES # BLD AUTO: 0.9 10*3/UL (ref 1.3–4.4)
LYMPHOCYTES NFR BLD AUTO: 12.7 % (ref 27–41)
MCH RBC QN AUTO: 25.7 PG (ref 27–31)
MCHC RBC AUTO-ENTMCNC: 30.5 G/DL (ref 33–37)
MCV RBC AUTO: 84.4 FL (ref 81–99)
MONOCYTES # BLD AUTO: 0.6 10*3/UL (ref 0.1–1)
MONOCYTES NFR BLD MANUAL: 8.3 % (ref 3–9)
NEUT #: 5.6 10*3/UL (ref 2.3–7.9)
NEUT %: 76.7 % (ref 47–73)
NRBC BLD QL AUTO: 0 % (ref 0–0)
PLATELET # BLD AUTO: 202 10*3/UL (ref 130–400)
PMV BLD AUTO: 12.3 FL (ref 9.6–12.3)
POTASSIUM SERPL-SCNC: 4.8 MMOL/L (ref 3.5–5.1)
PROT SERPL-MCNC: 6.5 GM/DL (ref 6.4–8.2)
RBC # BLD AUTO: 5.13 10*6/UL (ref 4.1–5.1)
SODIUM SERPL-SCNC: 137 MMOL/L (ref 136–145)
WBC NRBC COR # BLD AUTO: 7.3 10*3/UL (ref 4.8–10.8)

## 2022-06-13 DIAGNOSIS — R94.39 ABNORMAL STRESS TEST: ICD-10-CM

## 2022-06-13 DIAGNOSIS — I48.91 ATRIAL FIBRILLATION, UNSPECIFIED TYPE (HCC): ICD-10-CM

## 2022-06-13 DIAGNOSIS — E11.69 TYPE 2 DIABETES MELLITUS WITH OTHER SPECIFIED COMPLICATION, UNSPECIFIED WHETHER LONG TERM INSULIN USE (HCC): ICD-10-CM

## 2022-06-15 ENCOUNTER — TELEPHONE (OUTPATIENT)
Dept: CARDIAC CATH/INVASIVE PROCEDURES | Age: 67
End: 2022-06-15

## 2022-06-15 ENCOUNTER — HOSPITAL ENCOUNTER (OUTPATIENT)
Dept: HOSPITAL 83 - CARD | Age: 67
Discharge: HOME | End: 2022-06-15
Attending: INTERNAL MEDICINE
Payer: COMMERCIAL

## 2022-06-15 DIAGNOSIS — I08.0: Primary | ICD-10-CM

## 2022-06-15 LAB
LEFT VENTRICULAR EJECTION FRACTION HIGH VALUE: NORMAL
LEFT VENTRICULAR EJECTION FRACTION MODE: NORMAL
LV EF: NORMAL %

## 2022-06-16 ENCOUNTER — HOSPITAL ENCOUNTER (INPATIENT)
Dept: CARDIAC CATH/INVASIVE PROCEDURES | Age: 67
LOS: 1 days | Discharge: HOME | DRG: 247 | End: 2022-06-17
Attending: FAMILY MEDICINE | Admitting: FAMILY MEDICINE
Payer: MEDICARE

## 2022-06-16 DIAGNOSIS — I25.10 CAD IN NATIVE ARTERY: ICD-10-CM

## 2022-06-16 DIAGNOSIS — I48.0 PAROXYSMAL ATRIAL FIBRILLATION (HCC): ICD-10-CM

## 2022-06-16 DIAGNOSIS — R06.09 DOE (DYSPNEA ON EXERTION): ICD-10-CM

## 2022-06-16 DIAGNOSIS — R07.89 OTHER CHEST PAIN: ICD-10-CM

## 2022-06-16 LAB
ABO/RH: NORMAL
ANION GAP SERPL CALCULATED.3IONS-SCNC: 13 MMOL/L (ref 7–16)
ANTIBODY SCREEN: NORMAL
BASOPHILS ABSOLUTE: 0.04 E9/L (ref 0–0.2)
BASOPHILS RELATIVE PERCENT: 0.5 % (ref 0–2)
BUN BLDV-MCNC: 20 MG/DL (ref 6–23)
CALCIUM SERPL-MCNC: 9.7 MG/DL (ref 8.6–10.2)
CHLORIDE BLD-SCNC: 105 MMOL/L (ref 98–107)
CO2: 24 MMOL/L (ref 22–29)
CREAT SERPL-MCNC: 1 MG/DL (ref 0.5–1)
EOSINOPHILS ABSOLUTE: 0.1 E9/L (ref 0.05–0.5)
EOSINOPHILS RELATIVE PERCENT: 1.2 % (ref 0–6)
GFR AFRICAN AMERICAN: >60
GFR NON-AFRICAN AMERICAN: 55 ML/MIN/1.73
GLUCOSE BLD-MCNC: 174 MG/DL (ref 74–99)
HCT VFR BLD CALC: 45.3 % (ref 34–48)
HEMOGLOBIN: 14.2 G/DL (ref 11.5–15.5)
IMMATURE GRANULOCYTES #: 0.03 E9/L
IMMATURE GRANULOCYTES %: 0.4 % (ref 0–5)
INR BLD: 2.5
LYMPHOCYTES ABSOLUTE: 1.12 E9/L (ref 1.5–4)
LYMPHOCYTES RELATIVE PERCENT: 13.9 % (ref 20–42)
MCH RBC QN AUTO: 25.8 PG (ref 26–35)
MCHC RBC AUTO-ENTMCNC: 31.3 % (ref 32–34.5)
MCV RBC AUTO: 82.4 FL (ref 80–99.9)
MONOCYTES ABSOLUTE: 0.63 E9/L (ref 0.1–0.95)
MONOCYTES RELATIVE PERCENT: 7.8 % (ref 2–12)
NEUTROPHILS ABSOLUTE: 6.14 E9/L (ref 1.8–7.3)
NEUTROPHILS RELATIVE PERCENT: 76.2 % (ref 43–80)
PDW BLD-RTO: 14 FL (ref 11.5–15)
PLATELET # BLD: 184 E9/L (ref 130–450)
PMV BLD AUTO: 12.9 FL (ref 7–12)
POC ACT LR: >400 SECONDS
POTASSIUM SERPL-SCNC: 5.3 MMOL/L (ref 3.5–5)
PROTHROMBIN TIME: 27.5 SEC (ref 9.3–12.4)
RBC # BLD: 5.5 E12/L (ref 3.5–5.5)
SODIUM BLD-SCNC: 142 MMOL/L (ref 132–146)
WBC # BLD: 8.1 E9/L (ref 4.5–11.5)

## 2022-06-16 PROCEDURE — 027034Z DILATION OF CORONARY ARTERY, ONE ARTERY WITH DRUG-ELUTING INTRALUMINAL DEVICE, PERCUTANEOUS APPROACH: ICD-10-PCS | Performed by: INTERNAL MEDICINE

## 2022-06-16 PROCEDURE — 2709999900 HC NON-CHARGEABLE SUPPLY

## 2022-06-16 PROCEDURE — C9600 PERC DRUG-EL COR STENT SING: HCPCS

## 2022-06-16 PROCEDURE — C1887 CATHETER, GUIDING: HCPCS

## 2022-06-16 PROCEDURE — C1894 INTRO/SHEATH, NON-LASER: HCPCS

## 2022-06-16 PROCEDURE — 85347 COAGULATION TIME ACTIVATED: CPT

## 2022-06-16 PROCEDURE — 6370000000 HC RX 637 (ALT 250 FOR IP): Performed by: INTERNAL MEDICINE

## 2022-06-16 PROCEDURE — 6360000002 HC RX W HCPCS: Performed by: INTERNAL MEDICINE

## 2022-06-16 PROCEDURE — B2131ZZ FLUOROSCOPY OF MULTIPLE CORONARY ARTERY BYPASS GRAFTS USING LOW OSMOLAR CONTRAST: ICD-10-PCS | Performed by: INTERNAL MEDICINE

## 2022-06-16 PROCEDURE — 4A023N7 MEASUREMENT OF CARDIAC SAMPLING AND PRESSURE, LEFT HEART, PERCUTANEOUS APPROACH: ICD-10-PCS | Performed by: INTERNAL MEDICINE

## 2022-06-16 PROCEDURE — B2151ZZ FLUOROSCOPY OF LEFT HEART USING LOW OSMOLAR CONTRAST: ICD-10-PCS | Performed by: INTERNAL MEDICINE

## 2022-06-16 PROCEDURE — 93459 L HRT ART/GRFT ANGIO: CPT

## 2022-06-16 PROCEDURE — C1769 GUIDE WIRE: HCPCS

## 2022-06-16 PROCEDURE — C1874 STENT, COATED/COV W/DEL SYS: HCPCS

## 2022-06-16 PROCEDURE — B2111ZZ FLUOROSCOPY OF MULTIPLE CORONARY ARTERIES USING LOW OSMOLAR CONTRAST: ICD-10-PCS | Performed by: INTERNAL MEDICINE

## 2022-06-16 PROCEDURE — 92928 PRQ TCAT PLMT NTRAC ST 1 LES: CPT | Performed by: INTERNAL MEDICINE

## 2022-06-16 PROCEDURE — 86901 BLOOD TYPING SEROLOGIC RH(D): CPT

## 2022-06-16 PROCEDURE — 2580000003 HC RX 258: Performed by: INTERNAL MEDICINE

## 2022-06-16 PROCEDURE — C1725 CATH, TRANSLUMIN NON-LASER: HCPCS

## 2022-06-16 PROCEDURE — 80048 BASIC METABOLIC PNL TOTAL CA: CPT

## 2022-06-16 PROCEDURE — 2500000003 HC RX 250 WO HCPCS

## 2022-06-16 PROCEDURE — 86850 RBC ANTIBODY SCREEN: CPT

## 2022-06-16 PROCEDURE — 85610 PROTHROMBIN TIME: CPT

## 2022-06-16 PROCEDURE — 2140000000 HC CCU INTERMEDIATE R&B

## 2022-06-16 PROCEDURE — 36415 COLL VENOUS BLD VENIPUNCTURE: CPT

## 2022-06-16 PROCEDURE — 86900 BLOOD TYPING SEROLOGIC ABO: CPT

## 2022-06-16 PROCEDURE — 85025 COMPLETE CBC W/AUTO DIFF WBC: CPT

## 2022-06-16 PROCEDURE — 93459 L HRT ART/GRFT ANGIO: CPT | Performed by: INTERNAL MEDICINE

## 2022-06-16 PROCEDURE — 6360000002 HC RX W HCPCS

## 2022-06-16 PROCEDURE — 6370000000 HC RX 637 (ALT 250 FOR IP)

## 2022-06-16 RX ORDER — LISINOPRIL 5 MG/1
2.5 TABLET ORAL DAILY
Status: DISCONTINUED | OUTPATIENT
Start: 2022-06-17 | End: 2022-06-17 | Stop reason: HOSPADM

## 2022-06-16 RX ORDER — LEVOTHYROXINE SODIUM 0.03 MG/1
25 TABLET ORAL DAILY
Status: DISCONTINUED | OUTPATIENT
Start: 2022-06-17 | End: 2022-06-17 | Stop reason: HOSPADM

## 2022-06-16 RX ORDER — SODIUM CHLORIDE 9 MG/ML
INJECTION, SOLUTION INTRAVENOUS CONTINUOUS
Status: ACTIVE | OUTPATIENT
Start: 2022-06-16 | End: 2022-06-17

## 2022-06-16 RX ORDER — ASPIRIN 81 MG/1
81 TABLET ORAL DAILY
Status: DISCONTINUED | OUTPATIENT
Start: 2022-06-16 | End: 2022-06-17 | Stop reason: HOSPADM

## 2022-06-16 RX ORDER — FUROSEMIDE 40 MG/1
40 TABLET ORAL DAILY
Status: DISCONTINUED | OUTPATIENT
Start: 2022-06-16 | End: 2022-06-17 | Stop reason: HOSPADM

## 2022-06-16 RX ORDER — CARVEDILOL 6.25 MG/1
3.12 TABLET ORAL 2 TIMES DAILY
Status: DISCONTINUED | OUTPATIENT
Start: 2022-06-16 | End: 2022-06-17 | Stop reason: HOSPADM

## 2022-06-16 RX ORDER — SODIUM CHLORIDE 9 MG/ML
INJECTION, SOLUTION INTRAVENOUS ONCE
Status: DISCONTINUED | OUTPATIENT
Start: 2022-06-16 | End: 2022-06-17 | Stop reason: HOSPADM

## 2022-06-16 RX ORDER — SULFAMETHOXAZOLE AND TRIMETHOPRIM 400; 80 MG/1; MG/1
1 TABLET ORAL DAILY
COMMUNITY

## 2022-06-16 RX ORDER — ATORVASTATIN CALCIUM 40 MG/1
40 TABLET, FILM COATED ORAL DAILY
Status: DISCONTINUED | OUTPATIENT
Start: 2022-06-16 | End: 2022-06-17 | Stop reason: HOSPADM

## 2022-06-16 RX ORDER — CLOPIDOGREL BISULFATE 75 MG/1
75 TABLET ORAL DAILY
Status: DISCONTINUED | OUTPATIENT
Start: 2022-06-16 | End: 2022-06-17 | Stop reason: HOSPADM

## 2022-06-16 RX ORDER — LANOLIN ALCOHOL/MO/W.PET/CERES
400 CREAM (GRAM) TOPICAL 3 TIMES DAILY
Status: DISCONTINUED | OUTPATIENT
Start: 2022-06-16 | End: 2022-06-17 | Stop reason: HOSPADM

## 2022-06-16 RX ORDER — ASPIRIN 325 MG
325 TABLET ORAL ONCE
Status: DISCONTINUED | OUTPATIENT
Start: 2022-06-16 | End: 2022-06-16 | Stop reason: SDUPTHER

## 2022-06-16 RX ORDER — OXYCODONE HYDROCHLORIDE AND ACETAMINOPHEN 5; 325 MG/1; MG/1
1 TABLET ORAL EVERY 4 HOURS PRN
Status: DISCONTINUED | OUTPATIENT
Start: 2022-06-16 | End: 2022-06-17 | Stop reason: HOSPADM

## 2022-06-16 RX ORDER — POTASSIUM CHLORIDE 20 MEQ/1
20 TABLET, EXTENDED RELEASE ORAL DAILY
Status: DISCONTINUED | OUTPATIENT
Start: 2022-06-16 | End: 2022-06-17 | Stop reason: HOSPADM

## 2022-06-16 RX ORDER — AMIODARONE HYDROCHLORIDE 200 MG/1
200 TABLET ORAL DAILY
Status: DISCONTINUED | OUTPATIENT
Start: 2022-06-17 | End: 2022-06-17 | Stop reason: HOSPADM

## 2022-06-16 RX ORDER — ACETAMINOPHEN 325 MG/1
650 TABLET ORAL EVERY 4 HOURS PRN
Status: DISCONTINUED | OUTPATIENT
Start: 2022-06-16 | End: 2022-06-17 | Stop reason: HOSPADM

## 2022-06-16 RX ORDER — SERTRALINE HYDROCHLORIDE 100 MG/1
100 TABLET, FILM COATED ORAL DAILY
Status: DISCONTINUED | OUTPATIENT
Start: 2022-06-16 | End: 2022-06-17 | Stop reason: HOSPADM

## 2022-06-16 RX ORDER — MYCOPHENOLATE MOFETIL 250 MG/1
1000 CAPSULE ORAL 2 TIMES DAILY
Status: DISCONTINUED | OUTPATIENT
Start: 2022-06-16 | End: 2022-06-17 | Stop reason: HOSPADM

## 2022-06-16 RX ORDER — TACROLIMUS 1 MG/1
1 CAPSULE ORAL 2 TIMES DAILY
Status: DISCONTINUED | OUTPATIENT
Start: 2022-06-16 | End: 2022-06-17 | Stop reason: HOSPADM

## 2022-06-16 RX ADMIN — ATORVASTATIN CALCIUM 40 MG: 40 TABLET, FILM COATED ORAL at 21:56

## 2022-06-16 RX ADMIN — SODIUM CHLORIDE: 9 INJECTION, SOLUTION INTRAVENOUS at 17:13

## 2022-06-16 RX ADMIN — SERTRALINE 100 MG: 100 TABLET, FILM COATED ORAL at 22:18

## 2022-06-16 RX ADMIN — FUROSEMIDE 40 MG: 40 TABLET ORAL at 18:19

## 2022-06-16 RX ADMIN — CARVEDILOL 3.12 MG: 6.25 TABLET, FILM COATED ORAL at 23:31

## 2022-06-16 RX ADMIN — MYCOPHENOLATE MOFETIL 1000 MG: 250 CAPSULE ORAL at 21:57

## 2022-06-16 RX ADMIN — Medication 400 MG: at 21:56

## 2022-06-16 RX ADMIN — VERAPAMIL HYDROCHLORIDE 120 MG: 120 TABLET, FILM COATED, EXTENDED RELEASE ORAL at 18:19

## 2022-06-16 RX ADMIN — TACROLIMUS 1 MG: 1 CAPSULE ORAL at 21:56

## 2022-06-16 ASSESSMENT — PAIN SCALES - GENERAL
PAINLEVEL_OUTOF10: 0

## 2022-06-16 NOTE — PLAN OF CARE
Problem: Chronic Conditions and Co-morbidities  Goal: Patient's chronic conditions and co-morbidity symptoms are monitored and maintained or improved  Outcome: Progressing  Flowsheets (Taken 6/16/2022 1800)  Care Plan - Patient's Chronic Conditions and Co-Morbidity Symptoms are Monitored and Maintained or Improved:   Monitor and assess patient's chronic conditions and comorbid symptoms for stability, deterioration, or improvement   Collaborate with multidisciplinary team to address chronic and comorbid conditions and prevent exacerbation or deterioration     Problem: Discharge Planning  Goal: Discharge to home or other facility with appropriate resources  Outcome: Progressing  Flowsheets  Taken 6/16/2022 1815  Discharge to home or other facility with appropriate resources:   Identify barriers to discharge with patient and caregiver   Arrange for needed discharge resources and transportation as appropriate  Taken 6/16/2022 1800  Discharge to home or other facility with appropriate resources:   Identify barriers to discharge with patient and caregiver   Arrange for needed discharge resources and transportation as appropriate     Problem: Safety - Adult  Goal: Free from fall injury  Outcome: Progressing     Problem: ABCDS Injury Assessment  Goal: Absence of physical injury  Outcome: Progressing

## 2022-06-16 NOTE — PROCEDURES
Procedure:    1. Left heart cath with bypass grafts  2. Percutaneous coronary artery intervention of the mid LAD with a 2.75 x 15 mm drug-eluting stent. Complications: None    Physician: Anthony Barrios. Celeste BECKHAM. Assistant: none    Indication: Chest discomfort, abnormal stress test.  AUC: 7  AUC indication: 17    PCI AUC: 7  PCI AUC incication: 17    Anesthesia: 2% Xylocaine, intravenous fentanyl     Sedation: Intravenous Versed    Sedation time: I was present for sedation administration at 1557. I ended sedation at [id number] for a total face-to-face sedation time of 59 minutes. Estimated blood loss: Minimal    Specimens: none    Contrast used: 110 cc    Hemodynamics:  Opening Aortic pressure: 083/05  LV systolic pressure: 419  LVEDP: 24  No significant gradient across the aortic valve. Angiographic Results/findings:  Left Main: Moderately calcified. No angiographically significant stenosis. LAD: Mild to moderately calcified. Mid diffuse hazy 90% stenosis. Sequential mid diffuse 30 to 40% stenosis. Wraparound vessel. D1: Small vessel. No angiographically significant stenosis. D2: Small vessel. Ostial 99%, mid 75 to 80% diffuse stenosis. 1.7 mm vessel. .  Cx: Ostial 75% diffuse stenosis. Mid 100% chronic total occlusion. OM1: Tiny vessel. .  OM2: Tiny vessel. Ramus: Small vessel. Ostial mild luminal irregularities. .  RCA: Proximal 100% chronic total occlusion. .  SVG-OM 3: Widely patent with good distal runoff. SVG- posterolateral branch: Widely patent with good distal runoff. Retrograde filling into the PDA. Interventional results:  Mid LAD lesion  PrePCI CLEO 3 flow. Successful predilatation of the mid LAD lesion with a 2.5 mm balloon. This lesion was then crossed and stented with a 2.75 x 15 mm drug-eluting stent to 12 atmospheres of pressure. This resulted in 0 percent residual stenosis with CLEO-3 flow. This was then post dilatated with a 2.75 mm noncompliant balloon to 15 chacorta. Resulting in 0% residual stenosis with CLEO-3 flow. Summary of the procedure:  After obtaining informed consent they were taken to the cardiac Cath Lab where the area over the right radial artery was prepped and draped in a sterile fashion. Using ultrasound guidance and a micropuncture technique a 6 Hebrew slender glide sheath was placed in the right radial artery. This was aspirated & flushed several times throughout the procedure. This was medicated with verapamil and nitroglycerin. A 5 f TIG diagnostic catheter was advanced over a wire to the root of the aorta. It was aspirated & flushed with saline. Pressures were obtained. This was then filled with contrast.  This was then manipulated into the left main coronary artery. 4 orthogonal views were obtained. This was then manipulated into the SVG to the obtuse marginal and 2 orthogonal views were obtained. A 5 f R4 diagnostic catheter was advanced & manipulated into the RCA using the same technique. An DARRELL view was obtained. This was then manipulated into the SVG to the posterolateral branch. This did not sit well and was changed for a multipurpose catheter. 2 orthogonal views were obtained. A 5 f angled pigtail was then advanced & manipulated into the LV. This was then aspirated & flushed with saline & pressures were obtained. The catheter was then aspirated & flushed with saline once again & pull back pressures were then obtained across the aortic vlave. They were then anticoagulated with heparin to maintain an ACT greater than 250. Multiple guides were attempted. Ultimately, A 6 f M radial guiding catheter was used. They were already on ASA & they were loaded with Plavix. A luge wire was advanced across the LAD lesion and placed in the distal vessel. The lesion was then crossed and predilated with a 2.5 mm balloon.   We did not have a 2.75 x 12 mm stent available therefore, the lesion was then crossed and stented with a 2.75 x 15 mm drug-eluting stent inflated to 12 atmospheres of pressure. This resulted in 0 percent residual stenosis CLEO 3 flow. This was then postdilated with a 2.75 millimeter noncompliant balloon to 15 atmospheres. The balloon and wire were removed. A follow-up angiogram was performed. This demonstrated 0% residual stenosis and excellent CLEO-3 flow. The radial sheath was removed and vas band placed with good patent hemostasis. They tolerated the procedure well with no complications. Note: This report was completed using computerized voice recognition software. Every effort has been made to ensure accuracy, however; and invert and computerized transcription errors may be present.

## 2022-06-16 NOTE — PROGRESS NOTES
PMH:  1. BMI 36.6 on 6/16/2022  2. HTN  3. BP only from R arm per [facility] in 2016 (BP's are higher in that arm)  4. HLD  5. T2DM insulin requiring with neuropathy, nephropathy  6. Hx DVT  7. + hypercoagulable disorder on chronic coumadin therapy  8. 2014 R renal transplant  9. Hypothyroidism on replacement therapy  10. 12/2016 Lexiscan MPS: non ischemic EF 72%  11. 4/10/2017 [facility] @ [facility]--> 30% LM. Mild LAD disease, LCX ostial 80% stenosis, with large OM branch with 95% stenosis. 100% RCA with collaterals from L-->R. EF preserved. Yash Leach was referred for CABG due to osital LCx close to the LM and tortuous stenosis of OM and 100% occluded RCA  12. 4/17/2017 [place] Dr Dee Batter: CABG x 2 SVG-OM, SVG-PDA. Upon opening chest dark un-clotted blood ( ml suctioned out) in pericardium, diffuse pericarditis. 13. PAF diagnosed post CABG in 2017  14. 1/5/2022 TTE Dr Carpio Lis: Off axis views. Moderate CLVH. EF 60-65%. Stage I DD. Mild moderate AI. ild TR. Moderate pericardial material that appears to be a fat pad versus pericardial effusion without tamponade. 15. 1/2022 Zio XT (7 days); Minimum HR 37 maximum , average HR 74 bpm. 1st degree AVB. 2 runs of SVT longest 9 beats (162 bpm)  16. 5/23/2022 Lexiscan MPS [facility]: EF 72%. medium to large sized area of moderately reduced uptake in the mid and apical segment of the anteior and anterolateral wall      which is seen on the stress images and improves on the resting images. This area is hypokinetic and is most consistent with ischemia. There is a medium sized area of moderately reduced uptake     in the mid and apical segment of the inferolateral wall which is seen on the stress images and improves on the resting images. 17. 6/10/2022 Bun/Cr 21/1.2  18. 6/15/2022 TTE Dr Kavin Meza: EF 50-55%. Stage II DD. Moderate CLVH. Normal LA/RA. Normal RV size and function. Mild to moderate AI. Mild AS MG 14 mmHg.  Cannot r/o trace anterior pericardial effusion. Electronically signed by Deanna Melara.  MYRTLE Christian on 6/16/2022 at 3:21 PM

## 2022-06-16 NOTE — H&P
Hospitalist History & Physical      PCP: Esvin Salinas MD    Date of Service: Pt seen/examined on 2022    Chief Complaint:  had no chief complaint listed for this encounter. History Of Present Illness:    Ms. Carlos Alberto Woo, a 73y.o. year old female  who  has a past medical history of A-fib ([facility] CAD (coronary artery disease), Chronic kidney disease, Diabetes mellitus ([facility] DVT of lower extremity (deep venous thrombosis) ([facility] Hyperlipidemia, Hypertension, S/P angioplasty with stent, and Thyroid disease. Patient admitted by cardiology for cardiac catheterization after abnormal stress test.  Stress test on 2022 revealed EF 72%, medium to large sized area of moderate reduced uptake in the mid and apical segment of the anterior and anterolateral wall which is seen on the stress images and improves on the resting images. This area is hypokinetic most consistent with ischemia. There is a medium sized area of moderate reduced uptake. Cardiac cath was performed earlier today which showed 90% stenosis of LAD. Stents were placed. Procedure was performed without complications. Vital signs show the patient is currently hypertensive pressures 170/80. Laboratory studies demonstrate potassium 5.3, glucose 174. Past Medical History:   Diagnosis Date    A-fib [facility])     CAD (coronary artery disease)     Chronic kidney disease     Diabetes mellitus ([facility]     DVT of lower extremity (deep venous thrombosis) ([facility])     History    Hyperlipidemia     Hypertension     S/P angioplasty with stent 2022    LAD    Thyroid disease        Past Surgical History:   Procedure Laterality Date    CARDIAC SURGERY      cardiac bypass     SECTION      history    CHOLECYSTECTOMY      KIDNEY TRANSPLANT      TONSILLECTOMY         Prior to Admission medications    Medication Sig Start Date End Date Taking?  Authorizing Provider   sulfamethoxazole-trimethoprim (BACTRIM;SEPTRA) 400-80 MG per tablet Take 1 tablet by mouth daily   Yes Historical Provider, MD   warfarin (COUMADIN) 3 MG tablet Take 1 tablet by mouth daily Indications: 4 mg Mon & Thurs, 3 mg other days.   4/19/22   Historical Provider, MD   MAGNESIUM-OXIDE 400 (240 Mg) MG tablet Take 1 tablet by mouth in the morning, at noon, and at bedtime 3/21/22   Historical Provider, MD   amiodarone (CORDARONE) 200 MG tablet Take 1 tablet by mouth daily 3/18/22   Historical Provider, MD   amitriptyline (ELAVIL) 10 MG tablet TAKE 1 TABLET BY MOUTH AT BEDTIME FOR 90 DAYS 3/18/22   Historical Provider, MD MONTANEZ ASPIRIN ADULT LOW DOSE 81 MG EC tablet Take 1 tablet by mouth daily 4/27/22   Historical Provider, MD   atorvastatin (LIPITOR) 40 MG tablet Take 1 tablet by mouth daily 3/18/22   Historical Provider, MD   furosemide (LASIX) 40 MG tablet Take 1 tablet by mouth daily 3/18/22   Historical Provider, MD   NOVOLOG FLEXPEN 100 UNIT/ML injection pen INJECT SUBCUTANEOUSLY PER SLIDING SCALE, MAX DOSE 13 UNITS 3/18/22   Historical Provider, MD   LEVEMIR FLEXTOUCH 100 UNIT/ML injection pen INJECT [facility] 4/20/22   Historical Provider, MD   EUTHYROX 25 MCG tablet TAKE 1 TABLET BY MOUTH ONCE DAILY ON AN EMPTY STOMACH IN THE MORNING 4/2/22   Historical Provider, MD   lisinopril (PRINIVIL;ZESTRIL) 2.5 MG tablet TAKE 1 TABLET BY MOUTH ONCE DAILY FOR 90 DAYS 3/18/22   Historical Provider, MD   mycophenolate (CELLCEPT) 500 MG tablet Take 2 tablets by mouth in the morning and at bedtime 3/18/22   Historical Provider, MD   potassium chloride (KLOR-CON M) 20 MEQ extended release tablet Take 1 tablet by mouth daily 3/25/22   Historical Provider, MD DORSEY SENNA-S 8.6-50 MG per tablet as needed  3/18/22   Historical Provider, MD   sertraline (ZOLOFT) 100 MG tablet Take 1 tablet by mouth daily 3/25/22   Historical Provider, MD   tacrolimus (PROGRAF) 1 MG capsule Take 1 capsule by mouth in the morning and at bedtime 3/25/22   Historical Provider, MD verapamil (VERELAN) 120 MG extended release capsule Take 1 capsule by mouth daily 4/24/22   Historical Provider, MD   carvedilol (COREG) 3.125 MG tablet Take 1 tablet by mouth 2 times daily 5/5/22   Rachel Fischer MD         Allergies:  Latex, Adhesive tape, Hydralazine, and Ibuprofen    Social History:    TOBACCO:   reports that she has never smoked. She has never used smokeless tobacco.  ETOH:   reports previous alcohol use. Family History:    Reviewed in detail and negative for DM, CAD, Cancer, CVA. Positive as follows\"  History reviewed. No pertinent family history. REVIEW OF SYSTEMS:   Pertinent positives as noted in the HPI. All other systems reviewed and negative. PHYSICAL EXAM:  BP (!) 164/73   Pulse 50   Temp (!) 96.4 °F (35.8 °C)   Resp 18   Ht 5' 5\" (1.651 m)   Wt 220 lb (99.8 kg)   SpO2 97%   BMI 36.61 kg/m²   General appearance: No apparent distress, appears stated age and cooperative. HEENT: Normal cephalic, atraumatic without obvious deformity. Pupils equal, round, and reactive to light. Extra ocular muscles intact. Conjunctivae/corneas clear. Neck: Supple, with full range of motion. No jugular venous distention. Trachea midline. Respiratory: Clear to auscultation bilaterally  Cardiovascular: Regular rate and rhythm. Abdomen: Soft, nontender, nondistended  Musculoskeletal: No clubbing, cyanosis, edema of bilateral lower extremities. Brisk capillary refill. Skin: Normal skin color. No rashes or lesions. Neurologic:  Neurovascularly intact without any focal sensory/motor deficits.  Cranial nerves: II-XII intact, grossly non-focal.  Psychiatric: Alert and oriented, thought content appropriate, normal insight    Reviewed EKG and CXR personally      CBC:   Recent Labs     06/16/22  1350   WBC 8.1   RBC 5.50   HGB 14.2   HCT 45.3   MCV 82.4   RDW 14.0        BMP:   Recent Labs     06/16/22  1350      K 5.3*      CO2 24   BUN 20   CREATININE 1.0     LFT:  No results for input(s): PROT, ALB, ALKPHOS, ALT, AST, BILITOT, AMYLASE, LIPASE in the last 72 hours. CE:  No results for input(s): Anjana Galvan in the last 72 hours. PT/INR:   Recent Labs     22  1350   INR 2.5     BNP: No results for input(s): BNP in the last 72 hours.   ESR: No results found for: SEDRATE  CRP:   Lab Results   Component Value Date    CRP < 0.29 2017     D Dimer: No results found for: DDIMER   Folate and B12: No results found for: [id number], No results found for: FOLATE  Lactic Acid:   Lab Results   Component Value Date    LACTA 2.1 (H) 2017     Thyroid Studies:   Lab Results   Component Value Date    TSH 1.740 2017       Oupatient labs:  Lab Results   Component Value Date    CHOL 153 2017    TRIG 127 2017    HDL 58 2017    TSH 1.740 2017    INR 2.5 2022    LABA1C 7.3 (H) 2017       Urinalysis:    Lab Results   Component Value Date    NITRU NEGATIVE 2017    WBCUA 2-4 2017    BACTERIA TRACE 2017    RBCUA 2-4 2017    SPECGRAV 1.020 2017       Imaging:  NM MYOCARDIAL SPECT REST EXERCISE OR RX    Result Date: 2022                                            Name: Lemuel Briceno                                           Phys: STEPHANIE MCGREGOR,LAVELL                                           : 1955 Age: 73      Sex: F                                           Acct: [de-identified] Loc: CARD                                              Exam Date: 2022 Status: REG CLI                                           Radiology No: [id number]                                           Unit No: [id number]        EXAM#     TYPE/EXAM                       RESULT                      [id number] NM/NM MYOCARDIAL PERFUSION COMP SEE REPORT                              --------------- APPROVED REPORT --------------                  Exam: Pharmacologic      Reason for Exam: Central Chest Pain      Nurse:  Gabriel Gilbert, RN Patient Location: Outpatient                        NM Exam: Myocardial Perfusion REST/STRESS      Imaging Protocol: Rest Tc-99m/Stress Tc-99m 1 day            Consent: The procedure was explained and understood by the patient. Informed consent was witnessed by RN            Resting Data      Rest SPECT myocardial perfusion imaging was performed in supine      position 45 minutes following the intravenous injection of 10.8 mCi      of Tc-99m Sestamibi. Time of rest injection: 0845     Date: 2022      Time of rest imagin     Date: 2022            Pharmacologic Stress      Pharmacologic stress test was performed by injecting Regadenoson 0.4      mg IV push followed by the intravenous injection of 34.3 mCi of      Tc-99m Sestamibi. Time of stress injection: 1010     Date: 2022      Time of stress imagin     Date: 2022      Heart Rate at time of stress injection: 66 bpm.      Gated Stress SPECT was performed 45 minutes after stress      injection. The images were gated to evaluate regional wall motion and calculate      left ventricular ejection fraction. Study Quality      Study quality was good.       Artifact: No artifact       PAGE 1               Signed Report                     (CONTINUED)                                           Name: Russel Dutton                                           Phys: STEPHANIE MCGREGOR,LAVELL                                           : 1955 Age: 73      Sex: F                                           Acct: [de-identified] Loc: CARD                                              Exam Date: 2022 Status: REG CLI                                           Radiology No: [id number]                                           Unit No: [id number]        EXAM#     TYPE/EXAM                       RESULT                      [id number] NM/NM MYOCARDIAL PERFUSION COMP SEE REPORT                         <Continued>       Lung uptake Unit No: [id number]        EXAM#     TYPE/EXAM                       RESULT                      [id number] NM/NM MYOCARDIAL PERFUSION COMP SEE REPORT                         <Continued>       moderately decreased perfusion defect. The left ventricular systolic function is normal.      Regional wall motion abnormalities noted. There is a medium to largesized area of moderately reduced uptake in      the mid and apical segment of the anteior and anterolateral wall      which is seen on the stress images and improves on the resting      images. This area is hypokinetic and is most consistent with      ischemia. There is also a medium sized area of moderately reduced uptake in the      mid and apical segment of the inferolateral wall which is seen on the      stress images and improves on the resting images suggestive of      ischemia. Considering the quantitative measurements the study suggests an high      risk study for adverse myocardial events. <Conclusion>      Abnormal study. Scintigraphic evidence for medium to large sized,      moderately decreased perfusion defect. The left ventricular systolic function is normal.      Regional wall motion abnormalities noted. There is a medium to largesized area of moderately reduced uptake in      the mid and apical segment of the anteior and anterolateral wall      which is seen on the stress images and improves on the resting      images. This area is hypokinetic and is most consistent with      ischemia. There is also a medium sized area of moderately reduced uptake in the      mid and apical segment of the inferolateral wall which is seen on the      stress images and improves on the resting images suggestive of      ischemia.               PAGE 3               Signed Report                     (CONTINUED)                                           Name: Kaylin Jacobo                                           Phys: STEPHANIE MCGREGOR,LAVELL                                           : 1955 Age: 73      Sex: F                                           Acct: [de-identified] Loc: CARD                                              Exam Date: 2022 Status: REG CLI                                           Radiology No: [id number]                                           Unit No: [id number]        EXAM#     TYPE/EXAM                       RESULT                      [id number] NM/NM MYOCARDIAL PERFUSION COMP SEE REPORT                         <Continued>       Considering the quantitative measurements the study suggests an high      risk study for adverse myocardial events. ** REPORT SIGNED IN OTHER VENDOR SYSTEM 2022 **                     Reported By: Gayla Farris MD                                CC: Mikell Dakins MD; Gayla Farris MD                TechnologistHari Spence      Transcribed Date/Time: 2022 (9592)      : [id number]      Printed Date/Time: 2022 (0584)       PAGE 4               Signed Report      ASSESSMENT:  -Abnormal stress test  -Coronary artery disease  -History of kidney transplant  -Atrial fibrillation  -Diabetes mellitus type 2  -Hypertension  -Hyperlipidemia  -Hypothyroidism    PLAN:  -Admit to medicine  -Cardiology following  -Aspirin, atorvastatin, Plavix  -Cardiac rehab  -Continue amiodarone, carvedilol, furosemide  -Continue levothyroxine, lisinopril, CellCept  -Continue sertraline, tacrolimus, verapamil        Diet: ADULT DIET;  Regular; 4 carb choices (60 gm/meal)  Code Status: Full Code  Surrogate decision maker confirmed with patient:   Extended Emergency Contact Information  Primary Emergency Contact: Lazaro Millerkman  Address: [address] [place] [address], [address] [place] [address] Phone: [phone]  Relation: Spouse    DVT Prophylaxis: []Lovenox []Heparin []PCD [] [address] []Encouraged ambulation  Disposition: []Med/Surg [x] Intermediate [] ICU/CCU  Admit status: [] Observation [] Inpatient     +++++++++++++++++++++++++++++++++++++++++++++++++  Anish Ramal, DO  +++++++++++++++++++++++++++++++++++++++++++++++++  NOTE: This report was transcribed using voice recognition software. Every effort was made to ensure accuracy; however, inadvertent computerized transcription errors may be present.

## 2022-06-17 VITALS
SYSTOLIC BLOOD PRESSURE: 157 MMHG | HEART RATE: 61 BPM | TEMPERATURE: 97.8 F | DIASTOLIC BLOOD PRESSURE: 67 MMHG | RESPIRATION RATE: 18 BRPM | OXYGEN SATURATION: 96 % | BODY MASS INDEX: 36.65 KG/M2 | WEIGHT: 220 LBS | HEIGHT: 65 IN

## 2022-06-17 LAB
ALBUMIN SERPL-MCNC: 3.4 G/DL (ref 3.5–5.2)
ALP BLD-CCNC: 119 U/L (ref 35–104)
ALT SERPL-CCNC: 37 U/L (ref 0–32)
ANION GAP SERPL CALCULATED.3IONS-SCNC: 12 MMOL/L (ref 7–16)
AST SERPL-CCNC: 45 U/L (ref 0–31)
BILIRUB SERPL-MCNC: 0.6 MG/DL (ref 0–1.2)
BILIRUBIN DIRECT: <0.2 MG/DL (ref 0–0.3)
BILIRUBIN, INDIRECT: ABNORMAL MG/DL (ref 0–1)
BUN BLDV-MCNC: 17 MG/DL (ref 6–23)
CALCIUM SERPL-MCNC: 9.3 MG/DL (ref 8.6–10.2)
CHLORIDE BLD-SCNC: 104 MMOL/L (ref 98–107)
CO2: 23 MMOL/L (ref 22–29)
CREAT SERPL-MCNC: 0.9 MG/DL (ref 0.5–1)
EKG ATRIAL RATE: 54 BPM
EKG P AXIS: 32 DEGREES
EKG P-R INTERVAL: 198 MS
EKG Q-T INTERVAL: 454 MS
EKG QRS DURATION: 88 MS
EKG QTC CALCULATION (BAZETT): 430 MS
EKG R AXIS: 35 DEGREES
EKG T AXIS: 12 DEGREES
EKG VENTRICULAR RATE: 54 BPM
GFR AFRICAN AMERICAN: >60
GFR NON-AFRICAN AMERICAN: >60 ML/MIN/1.73
GLUCOSE BLD-MCNC: 275 MG/DL (ref 74–99)
POTASSIUM SERPL-SCNC: 4.7 MMOL/L (ref 3.5–5)
SODIUM BLD-SCNC: 139 MMOL/L (ref 132–146)
T4 FREE: 0.94 NG/DL (ref 0.93–1.7)
TOTAL PROTEIN: 6.5 G/DL (ref 6.4–8.3)
TSH SERPL DL<=0.05 MIU/L-ACNC: 6.8 UIU/ML (ref 0.27–4.2)

## 2022-06-17 PROCEDURE — 93005 ELECTROCARDIOGRAM TRACING: CPT | Performed by: INTERNAL MEDICINE

## 2022-06-17 PROCEDURE — 99232 SBSQ HOSP IP/OBS MODERATE 35: CPT | Performed by: INTERNAL MEDICINE

## 2022-06-17 PROCEDURE — 80076 HEPATIC FUNCTION PANEL: CPT

## 2022-06-17 PROCEDURE — 80048 BASIC METABOLIC PNL TOTAL CA: CPT

## 2022-06-17 PROCEDURE — 84443 ASSAY THYROID STIM HORMONE: CPT

## 2022-06-17 PROCEDURE — 6370000000 HC RX 637 (ALT 250 FOR IP): Performed by: INTERNAL MEDICINE

## 2022-06-17 PROCEDURE — 84439 ASSAY OF FREE THYROXINE: CPT

## 2022-06-17 PROCEDURE — 36415 COLL VENOUS BLD VENIPUNCTURE: CPT

## 2022-06-17 PROCEDURE — 6360000002 HC RX W HCPCS: Performed by: INTERNAL MEDICINE

## 2022-06-17 RX ORDER — CLOPIDOGREL BISULFATE 75 MG/1
75 TABLET ORAL DAILY
Qty: 30 TABLET | Refills: 3 | Status: SHIPPED | OUTPATIENT
Start: 2022-06-17

## 2022-06-17 RX ADMIN — FUROSEMIDE 40 MG: 40 TABLET ORAL at 10:00

## 2022-06-17 RX ADMIN — SERTRALINE 100 MG: 100 TABLET, FILM COATED ORAL at 10:00

## 2022-06-17 RX ADMIN — LISINOPRIL 2.5 MG: 5 TABLET ORAL at 10:00

## 2022-06-17 RX ADMIN — LEVOTHYROXINE SODIUM 25 MCG: 0.03 TABLET ORAL at 06:24

## 2022-06-17 RX ADMIN — CLOPIDOGREL BISULFATE 75 MG: 75 TABLET ORAL at 10:00

## 2022-06-17 RX ADMIN — AMIODARONE HYDROCHLORIDE 200 MG: 200 TABLET ORAL at 10:00

## 2022-06-17 RX ADMIN — VERAPAMIL HYDROCHLORIDE 120 MG: 120 TABLET, FILM COATED, EXTENDED RELEASE ORAL at 10:00

## 2022-06-17 RX ADMIN — Medication 400 MG: at 10:00

## 2022-06-17 RX ADMIN — ASPIRIN 81 MG: 81 TABLET, COATED ORAL at 10:00

## 2022-06-17 RX ADMIN — APIXABAN 5 MG: 5 TABLET, FILM COATED ORAL at 11:55

## 2022-06-17 RX ADMIN — TACROLIMUS 1 MG: 1 CAPSULE ORAL at 10:00

## 2022-06-17 RX ADMIN — CARVEDILOL 3.12 MG: 6.25 TABLET, FILM COATED ORAL at 10:00

## 2022-06-17 RX ADMIN — MYCOPHENOLATE MOFETIL 1000 MG: 250 CAPSULE ORAL at 10:00

## 2022-06-17 ASSESSMENT — PAIN SCALES - GENERAL
PAINLEVEL_OUTOF10: 0
PAINLEVEL_OUTOF10: 0

## 2022-06-17 NOTE — CARE COORDINATION
6/17 Care Coordination: pt in as an elective Cath/PCI, Stent to mid LAD. No needs identified, plan discharge home with spouse. CM/SW will continue to follow for discharge planning.    Esha MCCOLLUMN,RN-CV-BC  [phone]

## 2022-06-17 NOTE — DISCHARGE SUMMARY
Hospital Medicine Discharge Summary    Patient ID: Veronique Finney      Patient's PCP: Momo Boston MD    Admit Date: 6/16/2022     Discharge Date:   6/17/2022     Admitting Physician: Xiang Moseley MD     Discharge Physician: Jassi Yu MD     Discharge Diagnoses: Active Hospital Problems    Diagnosis Date Noted    CAD in native artery [I25.10] 06/16/2022     Priority: Medium       The patient was seen and examined on day of discharge and this discharge summary is in conjunction with any daily progress note from day of discharge. Hospital Course: This is a 73years old with past medical history of CAD, A. Fib, DM, HLP who was brought to the hospital for elective cardiac catheterization due to abnormal stress. She was found to have 100% chronic occlusion in proximal RCA,  90% stenosis in LAD, 75% diffuse Cx ostial stenosis. She had PCI with JOE placement to mid LAD lesion. Patient was discharged home in good condition with RX for DAPT to follow up with cardiologist for further outpatient management. Exam:     BP (!) 188/76 Comment: manual  Pulse 56   Temp 97.7 °F (36.5 °C) (Temporal)   Resp 18   Ht 5' 5\" (1.651 m)   Wt 220 lb (99.8 kg)   SpO2 93%   BMI 36.61 kg/m²     General appearance: Lying comfortably in bed. No apparent distress  Respiratory: Good air entry bilaterally. Clear to auscultation  Cardiovascular: Normal S1/S2. Regular rhythm and rate. Abdomen: Soft, non-tender, non-distended with normal bowel sounds. Extremities: No edema bilaterally. No cyanosis or clubbing   Skin: Skin color, texture, turgor normal.  No rashes or lesions. Neurologic:  No focal deficit. Psychiatric: Alert and oriented x3.   Mood and affect are appropriate       Consults:     IP CONSULT TO CARDIAC REHAB      Disposition:  Home     Condition at Discharge: Stable    Discharge Instructions/Follow-up:  Momo Boston MD     Code Status:  Full Code     Activity: activity as tolerated    Diet: diabetic diet    Labs: For convenience and continuity at follow-up the following most recent labs are provided:      CBC:    Lab Results   Component Value Date    WBC 8.1 06/16/2022    HGB 14.2 06/16/2022    HCT 45.3 06/16/2022     06/16/2022       Renal:    Lab Results   Component Value Date     06/17/2022    K 4.7 06/17/2022     06/17/2022    CO2 23 06/17/2022    BUN 17 06/17/2022    CREATININE 0.9 06/17/2022    CALCIUM 9.3 06/17/2022    PHOS 2.7 07/19/2017       Discharge Medications:     Current Discharge Medication List        START taking these medications    Details   clopidogrel (PLAVIX) 75 MG tablet Take 1 tablet by mouth daily  Qty: 30 tablet, Refills: 3           CONTINUE these medications which have NOT CHANGED    Details   sulfamethoxazole-trimethoprim (BACTRIM;SEPTRA) 400-80 MG per tablet Take 1 tablet by mouth daily      warfarin (COUMADIN) 3 MG tablet Take 1 tablet by mouth daily Indications: 4 mg Mon & Thurs, 3 mg other days.        MAGNESIUM-OXIDE 400 (240 Mg) MG tablet Take 1 tablet by mouth in the morning, at noon, and at bedtime      amiodarone (CORDARONE) 200 MG tablet Take 1 tablet by mouth daily      amitriptyline (ELAVIL) 10 MG tablet TAKE 1 TABLET BY MOUTH AT BEDTIME FOR 90 DAYS      EQ ASPIRIN ADULT LOW DOSE 81 MG EC tablet Take 1 tablet by mouth daily      atorvastatin (LIPITOR) 40 MG tablet Take 1 tablet by mouth daily      furosemide (LASIX) 40 MG tablet Take 1 tablet by mouth daily      NOVOLOG FLEXPEN 100 UNIT/ML injection pen INJECT SUBCUTANEOUSLY PER SLIDING SCALE, MAX DOSE 13 UNITS      LEVEMIR FLEXTOUCH 100 UNIT/ML injection pen INJECT 30 UNITS SUBCUTANEOUSLY TWICE DAILY      EUTHYROX 25 MCG tablet TAKE 1 TABLET BY MOUTH ONCE DAILY ON AN EMPTY STOMACH IN THE MORNING      lisinopril (PRINIVIL;ZESTRIL) 2.5 MG tablet TAKE 1 TABLET BY MOUTH ONCE DAILY FOR 90 DAYS      mycophenolate (CELLCEPT) 500 MG tablet Take 2 tablets by mouth in the morning and at bedtime      potassium chloride (KLOR-CON M) 20 MEQ extended release tablet Take 1 tablet by mouth daily      SM SENNA-S 8.6-50 MG per tablet as needed       sertraline (ZOLOFT) 100 MG tablet Take 1 tablet by mouth daily      tacrolimus (PROGRAF) 1 MG capsule Take 1 capsule by mouth in the morning and at bedtime      verapamil (VERELAN) 120 MG extended release capsule Take 1 capsule by mouth daily      carvedilol (COREG) 3.125 MG tablet Take 1 tablet by mouth 2 times daily  Qty: 60 tablet, Refills: 3             Time Spent on discharge is more than 30 minutes in the examination, evaluation, counseling and review of medications and discharge plan. Signed:    Kelli Piña MD   6/17/2022      Thank you Elina Tapia MD for the opportunity to be involved in this patient's care. If you have any questions or concerns please feel free to contact me at [phone].

## 2022-06-17 NOTE — PROGRESS NOTES
CLINICAL PHARMACY NOTE: MEDS TO BEDS    Total # of Prescriptions Filled: 1   The following medications were delivered to the patient:  · PLAVIX 75 MG     Additional Documentation:

## 2022-06-17 NOTE — PROGRESS NOTES
Removed Vasc Band after no air left placed 2x2 with opsite over it. Patient tolerated well . Site looks swollen but soft to palpate no Hematoma no bleeding noted at site.

## 2022-06-17 NOTE — PROGRESS NOTES
Discharged to home goals met. Discharge  Instructions given. Monitor removed and placed in nurses station.

## 2022-06-17 NOTE — PROGRESS NOTES
PROGRESS NOTE     CARDIOLOGY    Chief complaint: Seen today for follow up, management & recommendations for coronary artery disease    She denies chest pain or shortness of breath today. She was lying flat in bed. She was comfortable and in no distress. Wt Readings from Last 3 Encounters:   06/16/22 220 lb (99.8 kg)   05/05/22 219 lb (99.3 kg)     Temp Readings from Last 3 Encounters:   06/17/22 97.4 °F (36.3 °C) (Temporal)     BP Readings from Last 3 Encounters:   06/17/22 (!) 146/63   05/05/22 (!) 158/76     Pulse Readings from Last 3 Encounters:   06/17/22 50   05/05/22 68         Intake/Output Summary (Last 24 hours) at 6/17/2022 1109  Last data filed at 6/17/2022 1001  Gross per 24 hour   Intake 1559.79 ml   Output 1450 ml   Net 109.79 ml       Recent Labs     06/16/22  1350   WBC 8.1   HGB 14.2   HCT 45.3   MCV 82.4        Recent Labs     06/16/22  1350 06/17/22  0702    139   K 5.3* 4.7    104   CO2 24 23   BUN 20 17   CREATININE 1.0 0.9     Recent Labs     06/16/22  1350   PROTIME 27.5*   INR 2.5     No results for input(s): CKTOTAL, CKMB, CKMBINDEX, TROPONINI in the last 72 hours. No results for input(s): BNP in the last 72 hours. No results for input(s): CHOL, HDL, TRIG in the last 72 hours. Invalid input(s): CHOLHDLR, LDLCALCU  No results for input(s): TROPHS in the last 72 hours.       0.9 % sodium chloride infusion, Once  0.9 % sodium chloride infusion, Once  acetaminophen (TYLENOL) tablet 650 mg, Q4H PRN  clopidogrel (PLAVIX) tablet 75 mg, Daily  aspirin EC tablet 81 mg, Daily  oxyCODONE-acetaminophen (PERCOCET) 5-325 MG per tablet 1 tablet, Q4H PRN  magnesium oxide (MAG-OX) tablet 400 mg, TID  amiodarone (CORDARONE) tablet 200 mg, Daily  atorvastatin (LIPITOR) tablet 40 mg, Daily  furosemide (LASIX) tablet 40 mg, Daily  levothyroxine (SYNTHROID) tablet 25 mcg, Daily  lisinopril (PRINIVIL;ZESTRIL) tablet 2.5 mg, Daily  mycophenolate (CELLCEPT) capsule 1,000 mg, BID  [Held by provider] potassium chloride (KLOR-CON M) extended release tablet 20 mEq, Daily  sertraline (ZOLOFT) tablet 100 mg, Daily  tacrolimus (PROGRAF) capsule 1 mg, BID  verapamil (CALAN SR) extended release tablet 120 mg, Daily  perflutren lipid microspheres (DEFINITY) injection 1.65 mg, ONCE PRN  regadenoson (LEXISCAN) injection 0.4 mg, ONCE PRN  carvedilol (COREG) tablet 3.125 mg, BID        Review of systems:     Heart: as above   Lungs: as above   Eyes: denies changes in vision or discharge. Ears: denies changes in hearing or pain. Nose: denies epistaxis or masses   Throat: denies sore throat or trouble swallowing. Neuro: denies numbness, tingling, tremors. Skin: denies rashes or itching. : denies hematuria, dysuria   GI: denies vomiting, diarrhea   Psych: denies mood changed, anxiety, depression. Physical exam:    Constitutional: A&O x3, communicates well, no acute distress. Eyes: extraocular muscles intact, PERRL. Normal lids & conjunctiva. No icterus. ENT: clear, no bleeding. No external masses. Lips normal formation. Neck: supple, full ROM, no JVD, no bruits, no lymphadenopathy. No masses. trachea midline. Heart: regular rate & rhythm, normal S1 & S2, no abnormal murmurs. No heave. Lungs: CTA. No accessory muscles. Abd: soft, non-tender. Normal bowel sounds. Obese  Neuro: Full ROM X 4, EOMI, no tremors. EXT: No bilateral lower extremity edema  Skin: warm, dry, intact. Good turgor. Psych: A&O x 3, normal behavior, not anxious. Radial access site: No complications. Assessment/Recommendations  1. Coronary artery disease. Prior bypass surgery. 2.75 x 15 mm drug-eluting stent to the mid LAD yesterday. Good results. 2. Hypercoagulable state. On Coumadin therapy. 3. Paroxysmal atrial fibrillation. On Coumadin therapy. 4. DVT. On Coumadin therapy. IVC filter. 5. Hypertension  6. Diabetes  7. Hypercholesterolemia  8. Status post kidney transplant.   Chronic renal insufficiency. Creatinine stable today after heart cath yesterday. 9. Triple therapy with aspirin, Plavix, and Eliquis for 1 week and then Plavix and Eliquis after that. Note: This report was completed using computerized voice recognition software. Every effort has been made to ensure accuracy, however; and invert and computerized transcription errors may be present.

## 2022-06-17 NOTE — DISCHARGE INSTR - DIET
stir-fries and soups. Limit saturated fat  Read food labels, and try to avoid saturated fats. They increase your risk of heart disease. Use olive or canola oil when you cook. Bake, broil, grill, or steam foods instead of frying them. Choose lean meats instead of high-fat meats such as hot dogs and sausages. Cut off all visible fat when you prepare meat. Eat fish, skinless poultry, and meat alternatives such as soy products instead of high-fat meats. Soy products, such as tofu, may be especially good for your heart. Choose low-fat or fat-free milk and dairy products. Eat foods high in fiber  Eat a variety of grain products every day. Include whole-grain foods that have lots of fiber and nutrients. Examples of whole-grain foods include oats, whole wheat bread, and brown rice. Buy whole-grain breads and cereals, instead of white bread or pastries. Limit salt and sodium  Limit how much salt and sodium you eat to help lower your blood pressure. Taste food before you salt it. Add only a little salt when you think you need it. With time, your taste buds will adjust to less salt. Eat fewer snack items, fast foods, and other high-salt, processed foods. Check food labels for the amount of sodium in packaged foods. Choose low-sodium versions of canned goods (such as soups, vegetables, and beans). Limit sugar  Limit drinks and foods with added sugar. These include candy, desserts, and soda pop. Limit alcohol  Limit alcohol to no more than 2 drinks a day for men and 1 drink a day for women. Too much alcohol can cause health problems. When should you call for help? Watch closely for changes in your health, and be sure to contact your doctor if:    You would like help planning heart-healthy meals. Where can you learn more? Go to https://tanisha.[facility]. org and sign in to your [employer] account. Enter V137 in the [facility] box to learn more about \"Heart-Healthy Diet: Care Instructions. \" If you do not have an account, please click on the \"Sign Up Now\" link. Current as of: September 8, 2021               Content Version: 13.2  © 1927-3306 Healthwise, Incorporated. Care instructions adapted under license by [facility] ([facility]). If you have questions about a medical condition or this instruction, always ask your healthcare professional. [address] any warranty or liability for your use of this information.

## 2022-06-17 NOTE — CONSULTS
Met with patient and discussed that their physician has ordered a referral to our outpatient Phase II Cardiac Rehabilitation program. Reviewed the benefits of cardiac rehabilitation based on their diagnosis and personal risk factors. Patient demonstrates moderate interest in Cardiac Rehabilitation at this time. Cardiac Rehabilitation brochure provided to patient/family. The Cardiac Rehabilitation Program has been provided the patient's referral information and pertinent patient details and history. The patient may call [facility] at [phone] for additional information or questions. Contact information for [facility] and other choices close to the patient's residence have been provided in the discharge instructions so that the patient may call and schedule an appointment when cleared by their physician.  Thank you for the referral.

## 2022-06-20 ENCOUNTER — TELEPHONE (OUTPATIENT)
Dept: CARDIOLOGY CLINIC | Age: 67
End: 2022-06-20

## 2022-06-21 NOTE — TELEPHONE ENCOUNTER
Contacted patient with echo results and recommendations per Dr. Mimi Noonan. Patient verbalized understanding. Patient scheduled for HFU in September.

## 2022-06-21 NOTE — TELEPHONE ENCOUNTER
----- Message from Behzad Fletcher MD sent at 6/21/2022  8:05 AM EDT -----  Heart is pumping fine but the heart muscle is moderately thick and there are some leaky valves which we will monitor. Details will be discussed during follow-up visit.

## 2022-06-22 ENCOUNTER — HOSPITAL ENCOUNTER (OUTPATIENT)
Dept: HOSPITAL 83 - RESCLI | Age: 67
Discharge: HOME | End: 2022-06-22
Attending: STUDENT IN AN ORGANIZED HEALTH CARE EDUCATION/TRAINING PROGRAM
Payer: COMMERCIAL

## 2022-06-22 DIAGNOSIS — Z79.899: ICD-10-CM

## 2022-06-22 DIAGNOSIS — Z94.0: ICD-10-CM

## 2022-06-22 DIAGNOSIS — Z79.82: ICD-10-CM

## 2022-06-22 DIAGNOSIS — F32.89: ICD-10-CM

## 2022-06-22 DIAGNOSIS — I11.0: ICD-10-CM

## 2022-06-22 DIAGNOSIS — Z95.5: ICD-10-CM

## 2022-06-22 DIAGNOSIS — Z79.01: Primary | ICD-10-CM

## 2022-06-22 DIAGNOSIS — I25.10: ICD-10-CM

## 2022-06-22 DIAGNOSIS — E11.9: ICD-10-CM

## 2022-06-22 DIAGNOSIS — K59.00: ICD-10-CM

## 2022-06-22 DIAGNOSIS — E78.5: ICD-10-CM

## 2022-06-22 DIAGNOSIS — I50.32: ICD-10-CM

## 2022-06-22 DIAGNOSIS — I48.0: ICD-10-CM

## 2022-06-22 DIAGNOSIS — E03.9: ICD-10-CM

## 2022-06-22 DIAGNOSIS — Z88.8: ICD-10-CM

## 2022-06-22 LAB — INR BLD: 2 (ref 2–3.5)

## 2022-06-27 ENCOUNTER — HOSPITAL ENCOUNTER (OUTPATIENT)
Dept: HOSPITAL 83 - LAB | Age: 67
Discharge: HOME | End: 2022-06-27
Attending: STUDENT IN AN ORGANIZED HEALTH CARE EDUCATION/TRAINING PROGRAM
Payer: COMMERCIAL

## 2022-06-27 DIAGNOSIS — Z79.01: Primary | ICD-10-CM

## 2022-06-27 LAB — INR BLD: 2.7 (ref 2–3.5)

## 2022-07-12 ENCOUNTER — HOSPITAL ENCOUNTER (OUTPATIENT)
Dept: HOSPITAL 83 - LAB | Age: 67
Discharge: HOME | End: 2022-07-12
Attending: INTERNAL MEDICINE
Payer: COMMERCIAL

## 2022-07-12 DIAGNOSIS — E55.9: Primary | ICD-10-CM

## 2022-07-12 DIAGNOSIS — Z94.0: ICD-10-CM

## 2022-07-12 LAB
25(OH)D3 SERPL-MCNC: 24.1 NG/ML (ref 30–100)
BACTERIA #/AREA URNS HPF: (no result) /[HPF]
BASOPHILS # BLD AUTO: 0 10*3/UL (ref 0–0.1)
BASOPHILS NFR BLD AUTO: 0.7 % (ref 0–1)
BUN SERPL-MCNC: 15 MG/DL (ref 7–24)
CHLORIDE SERPL-SCNC: 109 MMOL/L (ref 98–107)
CREAT SERPL-MCNC: 1.11 MG/DL (ref 0.55–1.02)
EOSINOPHIL # BLD AUTO: 0.1 10*3/UL (ref 0–0.4)
EOSINOPHIL # BLD AUTO: 2.1 % (ref 1–4)
EPI CELLS #/AREA URNS HPF: (no result) /[HPF]
ERYTHROCYTE [DISTWIDTH] IN BLOOD BY AUTOMATED COUNT: 15.2 % (ref 0–14.5)
HCT VFR BLD AUTO: 43.4 % (ref 37–47)
LYMPHOCYTES # BLD AUTO: 1.1 10*3/UL (ref 1.3–4.4)
LYMPHOCYTES NFR BLD AUTO: 19.2 % (ref 27–41)
MCH RBC QN AUTO: 26.3 PG (ref 27–31)
MCHC RBC AUTO-ENTMCNC: 30.6 G/DL (ref 33–37)
MCV RBC AUTO: 85.8 FL (ref 81–99)
MONOCYTES # BLD AUTO: 0.5 10*3/UL (ref 0.1–1)
MONOCYTES NFR BLD MANUAL: 9.4 % (ref 3–9)
NEUT #: 3.9 10*3/UL (ref 2.3–7.9)
NEUT %: 68.4 % (ref 47–73)
NRBC BLD QL AUTO: 0 10*3/UL (ref 0–0)
PH UR STRIP: 5.5 [PH] (ref 4.5–8)
PLATELET # BLD AUTO: 206 10*3/UL (ref 130–400)
PMV BLD AUTO: 12.3 FL (ref 9.6–12.3)
POTASSIUM SERPL-SCNC: 4.1 MMOL/L (ref 3.5–5.1)
RBC # BLD AUTO: 5.06 10*6/UL (ref 4.1–5.1)
RBC #/AREA URNS HPF: (no result) RBC/HPF (ref 0–2)
SODIUM SERPL-SCNC: 142 MMOL/L (ref 136–145)
SP GR UR: 1.02 (ref 1–1.03)
URATE SERPL-MCNC: 4.5 MG/DL (ref 2.6–6)
UROBILINOGEN UR STRIP-MCNC: 1 E.U./DL (ref 0–1)
WBC #/AREA URNS HPF: (no result) WBC/HPF (ref 0–5)
WBC NRBC COR # BLD AUTO: 5.7 10*3/UL (ref 4.8–10.8)

## 2022-07-21 ENCOUNTER — HOSPITAL ENCOUNTER (OUTPATIENT)
Dept: HOSPITAL 83 - RESCLI | Age: 67
Discharge: HOME | End: 2022-07-21
Attending: STUDENT IN AN ORGANIZED HEALTH CARE EDUCATION/TRAINING PROGRAM
Payer: COMMERCIAL

## 2022-07-21 DIAGNOSIS — Z79.01: ICD-10-CM

## 2022-07-21 DIAGNOSIS — I25.10: ICD-10-CM

## 2022-07-21 DIAGNOSIS — F32.89: ICD-10-CM

## 2022-07-21 DIAGNOSIS — E78.5: ICD-10-CM

## 2022-07-21 DIAGNOSIS — I50.32: ICD-10-CM

## 2022-07-21 DIAGNOSIS — Z94.0: ICD-10-CM

## 2022-07-21 DIAGNOSIS — Z12.31: ICD-10-CM

## 2022-07-21 DIAGNOSIS — I11.0: Primary | ICD-10-CM

## 2022-07-21 DIAGNOSIS — Z79.899: ICD-10-CM

## 2022-07-21 DIAGNOSIS — I48.0: ICD-10-CM

## 2022-07-21 DIAGNOSIS — E03.9: ICD-10-CM

## 2022-07-21 DIAGNOSIS — R26.2: ICD-10-CM

## 2022-07-21 DIAGNOSIS — E11.9: ICD-10-CM

## 2022-07-21 DIAGNOSIS — Z79.82: ICD-10-CM

## 2022-07-21 DIAGNOSIS — M85.80: ICD-10-CM

## 2022-07-21 DIAGNOSIS — Z88.8: ICD-10-CM

## 2022-07-21 DIAGNOSIS — K59.00: ICD-10-CM

## 2022-07-22 ENCOUNTER — HOSPITAL ENCOUNTER (OUTPATIENT)
Dept: HOSPITAL 83 - LAB | Age: 67
Discharge: HOME | End: 2022-07-22
Attending: STUDENT IN AN ORGANIZED HEALTH CARE EDUCATION/TRAINING PROGRAM
Payer: COMMERCIAL

## 2022-07-22 DIAGNOSIS — E11.9: Primary | ICD-10-CM

## 2022-07-22 DIAGNOSIS — Z79.01: ICD-10-CM

## 2022-07-22 LAB — INR BLD: 3.3 (ref 2–3.5)

## 2022-08-30 ENCOUNTER — OFFICE VISIT (OUTPATIENT)
Dept: CARDIOLOGY CLINIC | Age: 67
End: 2022-08-30
Payer: MEDICAID

## 2022-08-30 VITALS
RESPIRATION RATE: 12 BRPM | DIASTOLIC BLOOD PRESSURE: 70 MMHG | SYSTOLIC BLOOD PRESSURE: 138 MMHG | HEART RATE: 55 BPM | WEIGHT: 221 LBS | HEIGHT: 65 IN | BODY MASS INDEX: 36.82 KG/M2

## 2022-08-30 DIAGNOSIS — I25.10 CAD IN NATIVE ARTERY: Primary | ICD-10-CM

## 2022-08-30 DIAGNOSIS — R79.89 ELEVATED LFTS: ICD-10-CM

## 2022-08-30 PROCEDURE — 1123F ACP DISCUSS/DSCN MKR DOCD: CPT | Performed by: CLINICAL NURSE SPECIALIST

## 2022-08-30 PROCEDURE — 99213 OFFICE O/P EST LOW 20 MIN: CPT | Performed by: CLINICAL NURSE SPECIALIST

## 2022-08-30 PROCEDURE — 93000 ELECTROCARDIOGRAM COMPLETE: CPT | Performed by: INTERNAL MEDICINE

## 2022-09-01 ENCOUNTER — HOSPITAL ENCOUNTER (OUTPATIENT)
Dept: HOSPITAL 83 - LAB | Age: 67
Discharge: HOME | End: 2022-09-01
Attending: CLINICAL NURSE SPECIALIST
Payer: COMMERCIAL

## 2022-09-01 DIAGNOSIS — Z79.01: Primary | ICD-10-CM

## 2022-09-01 LAB
ALBUMIN: 3.2 GM/DL (ref 3.1–4.5)
ALP BLD-CCNC: 129 U/L (ref 45–117)
ALP SERPL-CCNC: 129 U/L (ref 45–117)
ALT SERPL W P-5'-P-CCNC: 36 U/L (ref 12–78)
ALT SERPL-CCNC: 36 U/L (ref 12–78)
AST SERPL-CCNC: 36 IU/L (ref 3–35)
AST SERPL-CCNC: 36 IU/L (ref 3–35)
BILIRUB SERPL-MCNC: 0.4 MG/DL (ref 0.2–1)
BILIRUBIN DIRECT: 0.2 MG/DL (ref 0–0.2)
INR BLD: 1.4 (ref 2–3.5)
PROT SERPL-MCNC: 6.8 GM/DL (ref 6.4–8.2)
TOTAL PROTEIN: 6.8 GM/DL (ref 6.4–8.2)

## 2022-09-01 NOTE — PROGRESS NOTES
OUTPATIENT CARDIOLOGY FOLLOW-UP    Name: Rojas Jacobs    Age: 78 y.o. Primary Care Physician: Machelle Syed MD    Date of Service: 8/30/2022    Chief Complaint: Coronary artery disease. Paroxysmal atrial fibrillation. Hypertension. Interim History:   Mrs. Tunde Burnham is a 78year old lady who is being seen today in post hospital follow up. She is known to Dr. Puneet Marie service and on our last office visit in May 2022 reported exertional dyspnea and chest discomfort responsive to sublingual nitroglycerin. Subsequent stress MPI was high risk and in June she underwent PCI to the LAD. She has been feeling well from a cardiac standpoint since, and denies chest discomfort or palpitations at a limited functional capacity (she ambulates with a walker). She suffers from pervasive dizziness and has poor balance and recurrent falls as a result. She remains on triple therapy and although she bruises easily she has had no bleeding issues recently. Review of Systems:   Cardiac: As per HPI  General: No fever, chills, significant weight changes, or unusual fatigue. Pulmonary: As per HPI  HEENT: No visual disturbances, difficult swallowing, epistaxis, or bleeding gums. She uses hearing aides. GI: No nausea, vomiting, or dark/bloody stools. : No dysuria, hematuria  Endocrine: No temperature intolerance or excessive thirst.   Musculoskeletal: Recurrent falls as above. She uses a walker with ambulation. Skin: No rash or ulcerations. Neuro: Dizziness as above. No syncope.    Psych: Currently with no depression, anxiety    Past Medical History:  Past Medical History:   Diagnosis Date    A[facility]fib ([facility]     CAD (coronary artery disease)     Chronic kidney disease     Diabetes mellitus ([facility]     DVT of lower extremity (deep venous thrombosis) ([facility]     History    Hyperlipidemia     Hypertension     S/P angioplasty with stent 06/16/2022    LAD    Thyroid disease        Past Surgical History:  Past Surgical History: atorvastatin (LIPITOR) 40 MG tablet Take 1 tablet by mouth daily      furosemide (LASIX) 40 MG tablet Take 1 tablet by mouth daily      NOVOLOG FLEXPEN 100 UNIT/ML injection pen INJECT SUBCUTANEOUSLY PER SLIDING SCALE, MAX DOSE 13 UNITS      LEVEMIR FLEXTOUCH 100 UNIT/ML injection pen INJECT 30 UNITS SUBCUTANEOUSLY TWICE DAILY      EUTHYROX 25 MCG tablet TAKE 1 TABLET BY MOUTH ONCE DAILY ON AN EMPTY STOMACH IN THE MORNING      lisinopril (PRINIVIL;ZESTRIL) 2.5 MG tablet TAKE 1 TABLET BY MOUTH ONCE DAILY FOR 90 DAYS      mycophenolate (CELLCEPT) 500 MG tablet Take 2 tablets by mouth in the morning and at bedtime      potassium chloride (KLOR-CON M) 20 MEQ extended release tablet Take 1 tablet by mouth daily      SM SENNA-S 8.6-50 MG per tablet as needed       sertraline (ZOLOFT) 100 MG tablet Take 1 tablet by mouth daily      tacrolimus (PROGRAF) 1 MG capsule Take 1 capsule by mouth in the morning and at bedtime      verapamil (VERELAN) 120 MG extended release capsule Take 1 capsule by mouth daily      carvedilol (COREG) 3.125 MG tablet Take 1 tablet by mouth 2 times daily 60 tablet 3     No current facility-administered medications for this visit. Physical Exam:  /70   Pulse 55   Resp 12   Ht 5' 5\" (1.651 m)   Wt 221 lb (100.2 kg)   BMI 36.78 kg/m²   Wt Readings from Last 3 Encounters:   08/30/22 221 lb (100.2 kg)   06/16/22 220 lb (99.8 kg)   05/05/22 219 lb (99.3 kg)     Appearance: Awake, alert and oriented x 3, no apparent acute distress  Skin: Intact, warm and dry  Head: Normocephalic, atraumatic  Eyes: EOMI, no conjunctival erythema  Neck: Supple, no elevated JVP, no carotid bruits  Lungs: Clear to auscultation bilaterally. No wheezes, rales, or rhonchi.   Cardiac: Regular rate and rhythm, +S1S2, no murmurs apparent  Abdomen: Soft, nontender, +bowel sounds  Extremities: Moves all extremities x 4, no lower extremity edema  Neurologic: No focal motor deficits apparent, normal mood and affect, alert and oriented x 3  Peripheral Pulses: Intact posterior tibial pulses bilaterally      Cardiac Tests:  EKG today showed SB, 55 bpm with old anterior infarct and low QRS voltage    Zio patch heart monitor January 28, 2022:  Patient had a min HR of 37 bpm, max HR of 162 bpm, and avg HR of 74  bpm. Predominant underlying rhythm was Sinus Rhythm. First Degree AV Block was present. 2 Supraventricular Tachycardia runs occurred, the run with the fastest interval lasting 9 beats with a max rate of 162 bpm, the longest lasting 8 beats with an avg rate of 96 bpm. Isolated SVEs were rare (<1.0%), SVE Couplets were rare (<1.0%), and SVE Triplets were rare (<1.0%). Isolated VEs were rare (<1.0%), VE Couplets were rare (<1.0%), and no VE Triplets were present. Lexiscan stress test 5/23/2022       <Conclusion>  Abnormal study. Scintigraphic evidence for medium to large sized,   moderately decreased perfusion defect. The left ventricular systolic function is normal. Regional wall motion abnormalities noted . There is medium to largesized area of moderately reduced uptake in  the mid and apical segment of the anteior and anterolateral wall  which is seen on the stress images and improves on the resting images. This area is hypokinetic and is most consistent with ischemia. There is also a medium sized area of moderately reduced uptake in the mid and apical segment of the inferolateral wall which is seen on the stress images and improves on the resting images suggestive of ischemia. TTE 6/15/2022 (Dr. Cristóbal Cee)         Cardiac catheterization 6/16/2022 (Dr. Josefina Spicer):   Hemodynamics:  Opening Aortic pressure: 324/54  LV systolic pressure: 302  LVEDP: 24  No significant gradient across the aortic valve. Angiographic Results/findings:  Left Main: Moderately calcified. No angiographically significant stenosis. LAD: Mild to moderately calcified. Mid diffuse hazy 90% stenosis. Sequential mid diffuse 30 to 40% stenosis. Wraparound vessel. D1: Small vessel. No angiographically significant stenosis. D2: Small vessel. Ostial 99%, mid 75 to 80% diffuse stenosis. 1.7 mm vessel. .  Cx: Ostial 75% diffuse stenosis. Mid 100% chronic total occlusion. OM1: Tiny vessel. .  OM2: Tiny vessel. Ramus: Small vessel. Ostial mild luminal irregularities. .  RCA: Proximal 100% chronic total occlusion. .  SVG-OM 3: Widely patent with good distal runoff. SVG- posterolateral branch: Widely patent with good distal runoff. Retrograde filling into the PDA. Interventional results:  Mid LAD lesion  PrePCI CLEO 3 flow. Successful predilatation of the mid LAD lesion with a 2.5 mm balloon. This lesion was then crossed and stented with a 2.75 x 15 mm drug-eluting stent to 12 atmospheres of pressure. This resulted in 0 percent residual stenosis with CLEO-3 flow. This was then post dilatated with a 2.75 mm noncompliant balloon to 15 chacorta. Resulting in 0% residual stenosis with CLEO-3 flow. Assessment:   Coronary artery disease s/p PCI/JOE to mid LAD 6/16/2022 with patent vein grafts on cath at that time  Hypertension   Hypercholesterolemia  Diabetes mellitus   Paroxysmal atrial fibrillation : in SR on Amiodarone  Chronic anticoagulation with warfarin: she reports labile INRs  History of DVT (unclear if provoked or unprovoked) s/p IVC filter   Alport Syndrome s/p kidney transplant.  Cr stable after recent cath   Chronic immunosuppressive therapy   Hypothyroidism : on replacement therapy         Treatment Plan:   Check LFTs as they were mildly elevated during recent admission (she was given prescription to have drawn with routine INR this week)  The option of changing from warfarin to DOAC was discussed at length (Eliquis was previously cost prohibitive but she has now has Medicare): she and her  will consider and discuss with her PCP  She may also be a candidate for LAAO with Watchman if she develops recurrent bleeding issues or continues to have frequent falls   Stop aspirin and continue warfarin (pending her decision to change to Eliquis as above) and clopidogrel   Surveillance LFTs and TFTs, CXR and eye exams due to chronic amiodarone therapy   Aggressive risk factor modification   Return office visit with Dr. Katja Campbell in one month. She was asked to contact our office with questions or concerns prior to then. The patient's current medication list, allergies, problem list and results of all previously ordered testing were reviewed at today's visit.     NIKOLE Barr-CNS   [facility]) Cardiology

## 2022-10-24 ENCOUNTER — HOSPITAL ENCOUNTER (OUTPATIENT)
Dept: HOSPITAL 83 - RESCLI | Age: 67
Discharge: HOME | End: 2022-10-24
Attending: INTERNAL MEDICINE
Payer: COMMERCIAL

## 2022-10-24 DIAGNOSIS — Z94.0: ICD-10-CM

## 2022-10-24 DIAGNOSIS — E03.9: ICD-10-CM

## 2022-10-24 DIAGNOSIS — H81.10: ICD-10-CM

## 2022-10-24 DIAGNOSIS — F32.9: ICD-10-CM

## 2022-10-24 DIAGNOSIS — E11.9: ICD-10-CM

## 2022-10-24 DIAGNOSIS — I48.0: ICD-10-CM

## 2022-10-24 DIAGNOSIS — F32.89: ICD-10-CM

## 2022-10-24 DIAGNOSIS — G43.909: ICD-10-CM

## 2022-10-24 DIAGNOSIS — I50.32: ICD-10-CM

## 2022-10-24 DIAGNOSIS — Z79.01: ICD-10-CM

## 2022-10-24 DIAGNOSIS — Z90.49: ICD-10-CM

## 2022-10-24 DIAGNOSIS — I11.0: ICD-10-CM

## 2022-10-24 DIAGNOSIS — Z79.899: ICD-10-CM

## 2022-10-24 DIAGNOSIS — I25.10: ICD-10-CM

## 2022-10-24 DIAGNOSIS — E78.5: ICD-10-CM

## 2022-10-24 DIAGNOSIS — Z23: Primary | ICD-10-CM

## 2022-10-24 LAB — INR BLD: 1.6 (ref 2–3.5)

## 2022-11-09 ENCOUNTER — HOSPITAL ENCOUNTER (OUTPATIENT)
Dept: HOSPITAL 83 - LAB | Age: 67
End: 2022-11-09
Attending: INTERNAL MEDICINE
Payer: COMMERCIAL

## 2022-11-09 DIAGNOSIS — Z94.0: ICD-10-CM

## 2022-11-09 DIAGNOSIS — I48.0: Primary | ICD-10-CM

## 2022-11-09 DIAGNOSIS — E55.9: ICD-10-CM

## 2022-11-09 LAB
25(OH)D3 SERPL-MCNC: 27.5 NG/ML (ref 30–100)
BASOPHILS # BLD AUTO: 0 10*3/UL (ref 0–0.1)
BASOPHILS NFR BLD AUTO: 0.5 % (ref 0–1)
BUN SERPL-MCNC: 17 MG/DL (ref 7–24)
CHLORIDE SERPL-SCNC: 105 MMOL/L (ref 98–107)
CREAT SERPL-MCNC: 1.15 MG/DL (ref 0.55–1.02)
EOSINOPHIL # BLD AUTO: 0.1 10*3/UL (ref 0–0.4)
EOSINOPHIL # BLD AUTO: 1.5 % (ref 1–4)
EPI CELLS #/AREA URNS HPF: (no result) /[HPF]
ERYTHROCYTE [DISTWIDTH] IN BLOOD BY AUTOMATED COUNT: 14.2 % (ref 0–14.5)
HCT VFR BLD AUTO: 42.6 % (ref 37–47)
INR BLD: 2.6 (ref 2–3.5)
LYMPHOCYTES # BLD AUTO: 1 10*3/UL (ref 1.3–4.4)
LYMPHOCYTES NFR BLD AUTO: 16.2 % (ref 27–41)
MCH RBC QN AUTO: 26.4 PG (ref 27–31)
MCHC RBC AUTO-ENTMCNC: 31 G/DL (ref 33–37)
MCV RBC AUTO: 85.2 FL (ref 81–99)
MONOCYTES # BLD AUTO: 0.6 10*3/UL (ref 0.1–1)
MONOCYTES NFR BLD MANUAL: 10 % (ref 3–9)
NEUT #: 4.4 10*3/UL (ref 2.3–7.9)
NEUT %: 71.3 % (ref 47–73)
NRBC BLD QL AUTO: 0 % (ref 0–0)
PH UR STRIP: 5.5 [PH] (ref 4.5–8)
PLATELET # BLD AUTO: 171 10*3/UL (ref 130–400)
PMV BLD AUTO: 12.9 FL (ref 9.6–12.3)
POTASSIUM SERPL-SCNC: 4.3 MMOL/L (ref 3.5–5.1)
RBC # BLD AUTO: 5 10*6/UL (ref 4.1–5.1)
RBC #/AREA URNS HPF: (no result) RBC/HPF (ref 0–2)
SODIUM SERPL-SCNC: 137 MMOL/L (ref 136–145)
SP GR UR: >= 1.03 (ref 1–1.03)
URATE SERPL-MCNC: 4.3 MG/DL (ref 2.6–6)
UROBILINOGEN UR STRIP-MCNC: 1 E.U./DL (ref 0–1)
WBC #/AREA URNS HPF: (no result) WBC/HPF (ref 0–5)
WBC NRBC COR # BLD AUTO: 6.1 10*3/UL (ref 4.8–10.8)

## 2023-02-17 ENCOUNTER — HOSPITAL ENCOUNTER (OUTPATIENT)
Dept: HOSPITAL 83 - LAB | Age: 68
Discharge: HOME | End: 2023-02-17
Attending: INTERNAL MEDICINE
Payer: COMMERCIAL

## 2023-02-17 DIAGNOSIS — Z94.0: ICD-10-CM

## 2023-02-17 DIAGNOSIS — E55.9: Primary | ICD-10-CM

## 2023-02-17 LAB
25(OH)D3 SERPL-MCNC: 31.2 NG/ML (ref 30–100)
BACTERIA #/AREA URNS HPF: (no result) /[HPF]
BASOPHILS # BLD AUTO: 0 10*3/UL (ref 0–0.1)
BASOPHILS NFR BLD AUTO: 0.3 % (ref 0–1)
BUN SERPL-MCNC: 18 MG/DL (ref 9–23)
CHLORIDE SERPL-SCNC: 101 MMOL/L (ref 98–107)
EOSINOPHIL # BLD AUTO: 0.1 10*3/UL (ref 0–0.4)
EOSINOPHIL # BLD AUTO: 1.3 % (ref 1–4)
EPI CELLS #/AREA URNS HPF: (no result) /[HPF]
ERYTHROCYTE [DISTWIDTH] IN BLOOD BY AUTOMATED COUNT: 14.2 % (ref 0–14.5)
HCT VFR BLD AUTO: 44.2 % (ref 37–47)
LYMPHOCYTES # BLD AUTO: 1.3 10*3/UL (ref 1.3–4.4)
LYMPHOCYTES NFR BLD AUTO: 18.7 % (ref 27–41)
MCH RBC QN AUTO: 26.7 PG (ref 27–31)
MCHC RBC AUTO-ENTMCNC: 31.4 G/DL (ref 33–37)
MCV RBC AUTO: 84.8 FL (ref 81–99)
MONOCYTES # BLD AUTO: 0.7 10*3/UL (ref 0.1–1)
MONOCYTES NFR BLD MANUAL: 10.3 % (ref 3–9)
NEUT #: 4.6 10*3/UL (ref 2.3–7.9)
NEUT %: 68.5 % (ref 47–73)
NRBC BLD QL AUTO: 0 % (ref 0–0)
PH UR STRIP: 7.5 [PH] (ref 4.5–8)
PLATELET # BLD AUTO: 160 10*3/UL (ref 130–400)
PMV BLD AUTO: 13.3 FL (ref 9.6–12.3)
POTASSIUM SERPL-SCNC: 4.1 MMOL/L (ref 3.4–5.1)
RBC # BLD AUTO: 5.21 10*6/UL (ref 4.1–5.1)
RBC #/AREA URNS HPF: (no result) RBC/HPF (ref 0–2)
SP GR UR: 1.02 (ref 1–1.03)
URATE SERPL-MCNC: 4.8 MG/DL (ref 3.1–7.8)
UROBILINOGEN UR STRIP-MCNC: 1 E.U./DL (ref 0–1)
WBC #/AREA URNS HPF: (no result) WBC/HPF (ref 0–5)
WBC NRBC COR # BLD AUTO: 6.7 10*3/UL (ref 4.8–10.8)

## 2023-05-08 ENCOUNTER — HOSPITAL ENCOUNTER (OUTPATIENT)
Dept: HOSPITAL 83 - RESCLI | Age: 68
Discharge: HOME | End: 2023-05-08
Attending: STUDENT IN AN ORGANIZED HEALTH CARE EDUCATION/TRAINING PROGRAM
Payer: COMMERCIAL

## 2023-05-08 DIAGNOSIS — Z88.8: ICD-10-CM

## 2023-05-08 DIAGNOSIS — I50.22: ICD-10-CM

## 2023-05-08 DIAGNOSIS — K59.00: ICD-10-CM

## 2023-05-08 DIAGNOSIS — I48.0: ICD-10-CM

## 2023-05-08 DIAGNOSIS — Z79.02: ICD-10-CM

## 2023-05-08 DIAGNOSIS — E11.9: ICD-10-CM

## 2023-05-08 DIAGNOSIS — I11.0: ICD-10-CM

## 2023-05-08 DIAGNOSIS — I25.10: ICD-10-CM

## 2023-05-08 DIAGNOSIS — E03.9: ICD-10-CM

## 2023-05-08 DIAGNOSIS — Z79.899: ICD-10-CM

## 2023-05-08 DIAGNOSIS — Z90.49: ICD-10-CM

## 2023-05-08 DIAGNOSIS — Z94.0: ICD-10-CM

## 2023-05-08 DIAGNOSIS — E55.9: ICD-10-CM

## 2023-05-08 DIAGNOSIS — Z98.890: ICD-10-CM

## 2023-05-08 DIAGNOSIS — I95.1: Primary | ICD-10-CM

## 2023-05-08 DIAGNOSIS — R26.2: ICD-10-CM

## 2023-05-08 DIAGNOSIS — E78.5: ICD-10-CM

## 2023-05-08 LAB — INR BLD: 2.4 (ref 2–3.5)

## 2023-05-18 ENCOUNTER — HOSPITAL ENCOUNTER (OUTPATIENT)
Dept: HOSPITAL 83 - LAB | Age: 68
Discharge: HOME | End: 2023-05-18
Attending: INTERNAL MEDICINE
Payer: COMMERCIAL

## 2023-05-18 DIAGNOSIS — E55.9: Primary | ICD-10-CM

## 2023-05-18 DIAGNOSIS — Z94.0: ICD-10-CM

## 2023-05-18 LAB
25(OH)D3 SERPL-MCNC: 41.3 NG/ML (ref 30–100)
BACTERIA #/AREA URNS HPF: (no result) /[HPF]
BASOPHILS # BLD AUTO: 0 10*3/UL (ref 0–0.1)
BASOPHILS NFR BLD AUTO: 0.4 % (ref 0–1)
BUN SERPL-MCNC: 12 MG/DL (ref 9–23)
CHLORIDE SERPL-SCNC: 102 MMOL/L (ref 98–107)
EOSINOPHIL # BLD AUTO: 0.1 10*3/UL (ref 0–0.4)
EOSINOPHIL # BLD AUTO: 1.3 % (ref 1–4)
ERYTHROCYTE [DISTWIDTH] IN BLOOD BY AUTOMATED COUNT: 14.3 % (ref 0–14.5)
HCT VFR BLD AUTO: 43.7 % (ref 37–47)
LYMPHOCYTES # BLD AUTO: 1 10*3/UL (ref 1.3–4.4)
LYMPHOCYTES NFR BLD AUTO: 11.4 % (ref 27–41)
MCH RBC QN AUTO: 26.5 PG (ref 27–31)
MCHC RBC AUTO-ENTMCNC: 30.4 G/DL (ref 33–37)
MCV RBC AUTO: 87.2 FL (ref 81–99)
MONOCYTES # BLD AUTO: 0.7 10*3/UL (ref 0.1–1)
MONOCYTES NFR BLD MANUAL: 7.7 % (ref 3–9)
MUCOUS THREADS URNS QL MICRO: (no result)
NEUT #: 7.1 10*3/UL (ref 2.3–7.9)
NEUT %: 78.8 % (ref 47–73)
NRBC BLD QL AUTO: 0 % (ref 0–0)
PH UR STRIP: 7.5 [PH] (ref 4.5–8)
PLATELET # BLD AUTO: 178 10*3/UL (ref 130–400)
PMV BLD AUTO: 13 FL (ref 9.6–12.3)
POTASSIUM SERPL-SCNC: 4.6 MMOL/L (ref 3.4–5.1)
RBC # BLD AUTO: 5.01 10*6/UL (ref 4.1–5.1)
RBC #/AREA URNS HPF: (no result) RBC/HPF (ref 0–2)
SP GR UR: 1.02 (ref 1–1.03)
URATE SERPL-MCNC: 3.9 MG/DL (ref 3.1–7.8)
UROBILINOGEN UR STRIP-MCNC: 1 E.U./DL (ref 0–1)
WBC #/AREA URNS HPF: (no result) WBC/HPF (ref 0–5)
WBC NRBC COR # BLD AUTO: 9.1 10*3/UL (ref 4.8–10.8)

## 2023-05-22 ENCOUNTER — HOSPITAL ENCOUNTER (OUTPATIENT)
Dept: HOSPITAL 83 - RESCLI | Age: 68
Discharge: HOME | End: 2023-05-22
Attending: STUDENT IN AN ORGANIZED HEALTH CARE EDUCATION/TRAINING PROGRAM
Payer: COMMERCIAL

## 2023-05-22 DIAGNOSIS — E11.9: ICD-10-CM

## 2023-05-22 DIAGNOSIS — I48.0: Primary | ICD-10-CM

## 2023-05-22 DIAGNOSIS — I10: ICD-10-CM

## 2023-05-22 DIAGNOSIS — Z98.890: ICD-10-CM

## 2023-05-22 DIAGNOSIS — Z86.16: ICD-10-CM

## 2023-05-22 DIAGNOSIS — Z79.899: ICD-10-CM

## 2023-05-22 DIAGNOSIS — I25.10: ICD-10-CM

## 2023-05-22 DIAGNOSIS — Z88.8: ICD-10-CM

## 2023-06-15 ENCOUNTER — HOSPITAL ENCOUNTER (OUTPATIENT)
Dept: HOSPITAL 83 - CARD | Age: 68
Discharge: HOME | End: 2023-06-15
Payer: COMMERCIAL

## 2023-06-15 DIAGNOSIS — R00.1: ICD-10-CM

## 2023-06-15 DIAGNOSIS — I51.7: ICD-10-CM

## 2023-06-15 DIAGNOSIS — I35.0: Primary | ICD-10-CM

## 2023-07-13 PROBLEM — E03.9 ACQUIRED HYPOTHYROIDISM: Status: ACTIVE | Noted: 2023-07-13

## 2023-07-13 PROBLEM — R29.6 RECURRENT FALLS: Status: ACTIVE | Noted: 2023-07-13

## 2023-07-13 PROBLEM — I50.32 CHRONIC DIASTOLIC HEART FAILURE (HCC): Status: ACTIVE | Noted: 2023-07-13

## 2023-07-13 PROBLEM — E83.39 HYPOPHOSPHATEMIA: Status: ACTIVE | Noted: 2023-07-13

## 2023-07-13 PROBLEM — F32.9 MAJOR DEPRESSION, SINGLE EPISODE: Status: ACTIVE | Noted: 2023-07-13

## 2023-07-13 PROBLEM — F33.0 MILD RECURRENT MAJOR DEPRESSION (HCC): Status: ACTIVE | Noted: 2023-07-13

## 2023-07-13 PROBLEM — N18.9 ANEMIA OF CHRONIC RENAL FAILURE: Status: ACTIVE | Noted: 2023-07-13

## 2023-07-13 PROBLEM — N18.6 END-STAGE RENAL FAILURE WITH RENAL TRANSPLANT (HCC): Status: ACTIVE | Noted: 2023-07-13

## 2023-07-13 PROBLEM — R26.2 DISABILITY OF WALKING: Status: ACTIVE | Noted: 2023-07-13

## 2023-07-13 PROBLEM — G43.019 REFRACTORY MIGRAINE WITHOUT AURA: Status: ACTIVE | Noted: 2023-07-13

## 2023-07-13 PROBLEM — J30.9 ALLERGIC RHINITIS: Status: ACTIVE | Noted: 2023-07-13

## 2023-07-13 PROBLEM — I82.539 CHRONIC DEEP VEIN THROMBOSIS (DVT) OF POPLITEAL VEIN (HCC): Status: ACTIVE | Noted: 2023-07-13

## 2023-07-13 PROBLEM — F33.40 RECURRENT MAJOR DEPRESSION IN REMISSION (HCC): Status: ACTIVE | Noted: 2023-07-13

## 2023-07-13 PROBLEM — D63.1 ANEMIA OF CHRONIC RENAL FAILURE: Status: ACTIVE | Noted: 2023-07-13

## 2023-07-13 PROBLEM — I82.90 VENOUS THROMBOEMBOLISM (VTE): Status: ACTIVE | Noted: 2023-07-13

## 2023-07-13 PROBLEM — N25.81 HYPERPARATHYROIDISM DUE TO RENAL INSUFFICIENCY (HCC): Status: ACTIVE | Noted: 2023-07-13

## 2023-07-13 PROBLEM — E11.65 POORLY CONTROLLED DIABETES MELLITUS (HCC): Status: ACTIVE | Noted: 2023-07-13

## 2023-07-13 PROBLEM — T86.19 END-STAGE RENAL FAILURE WITH RENAL TRANSPLANT (HCC): Status: ACTIVE | Noted: 2023-07-13

## 2023-07-13 PROBLEM — K59.00 CONSTIPATION: Status: ACTIVE | Noted: 2023-07-13

## 2023-07-13 PROBLEM — F32.89 OTHER SPECIFIED DEPRESSIVE EPISODES: Status: ACTIVE | Noted: 2023-07-13

## 2023-07-13 PROBLEM — E83.42 HYPOMAGNESEMIA: Status: ACTIVE | Noted: 2023-07-13

## 2023-07-13 PROBLEM — R27.0 ATAXIA: Status: ACTIVE | Noted: 2023-07-13

## 2023-07-13 PROBLEM — M75.110 PARTIAL THICKNESS ROTATOR CUFF TEAR: Status: ACTIVE | Noted: 2023-07-13

## 2023-07-13 PROBLEM — E78.2 MIXED HYPERLIPIDEMIA: Status: ACTIVE | Noted: 2023-07-13

## 2023-07-13 RX ORDER — AMLODIPINE BESYLATE 5 MG/1
5 TABLET ORAL DAILY
COMMUNITY

## 2023-07-13 RX ORDER — RANOLAZINE 1000 MG/1
TABLET, EXTENDED RELEASE ORAL
COMMUNITY

## 2023-07-13 RX ORDER — CHOLECALCIFEROL (VITAMIN D3) 125 MCG
1 CAPSULE ORAL DAILY
COMMUNITY
Start: 2023-05-23

## 2023-07-13 RX ORDER — CINACALCET 30 MG/1
TABLET, FILM COATED ORAL
COMMUNITY

## 2023-07-13 RX ORDER — PSEUDOEPHEDRINE HCL 30 MG
100 TABLET ORAL 2 TIMES DAILY PRN
COMMUNITY

## 2023-08-21 ENCOUNTER — HOSPITAL ENCOUNTER (OUTPATIENT)
Dept: HOSPITAL 83 - RESCLI | Age: 68
Discharge: HOME | End: 2023-08-21
Attending: INTERNAL MEDICINE
Payer: COMMERCIAL

## 2023-08-21 DIAGNOSIS — I48.0: Primary | ICD-10-CM

## 2023-08-21 DIAGNOSIS — E11.9: ICD-10-CM

## 2023-08-21 LAB — INR BLD: 2.3 (ref 2–3.5)

## 2023-08-30 ENCOUNTER — HOSPITAL ENCOUNTER (OUTPATIENT)
Dept: HOSPITAL 83 - LAB | Age: 68
Discharge: HOME | End: 2023-08-30
Attending: INTERNAL MEDICINE
Payer: COMMERCIAL

## 2023-08-30 DIAGNOSIS — E55.9: Primary | ICD-10-CM

## 2023-08-30 DIAGNOSIS — Z94.0: ICD-10-CM

## 2023-08-30 LAB
25(OH)D3 SERPL-MCNC: 40.7 NG/ML (ref 30–100)
BACTERIA #/AREA URNS HPF: (no result) /[HPF]
BASOPHILS # BLD AUTO: 0 10*3/UL (ref 0–0.1)
BASOPHILS NFR BLD AUTO: 0.3 % (ref 0–1)
BUN SERPL-MCNC: 13 MG/DL (ref 9–23)
CHLORIDE SERPL-SCNC: 105 MMOL/L (ref 98–107)
EOSINOPHIL # BLD AUTO: 0.1 10*3/UL (ref 0–0.4)
EOSINOPHIL # BLD AUTO: 1.5 % (ref 1–4)
EPI CELLS #/AREA URNS HPF: (no result) /[HPF]
ERYTHROCYTE [DISTWIDTH] IN BLOOD BY AUTOMATED COUNT: 14.6 % (ref 0–14.5)
HCT VFR BLD AUTO: 42.9 % (ref 37–47)
LYMPHOCYTES # BLD AUTO: 1.4 10*3/UL (ref 1.3–4.4)
LYMPHOCYTES NFR BLD AUTO: 20.6 % (ref 27–41)
MCH RBC QN AUTO: 27.1 PG (ref 27–31)
MCHC RBC AUTO-ENTMCNC: 31.2 G/DL (ref 33–37)
MCV RBC AUTO: 86.8 FL (ref 81–99)
MONOCYTES # BLD AUTO: 0.6 10*3/UL (ref 0.1–1)
MONOCYTES NFR BLD MANUAL: 9.7 % (ref 3–9)
NEUT #: 4.4 10*3/UL (ref 2.3–7.9)
NEUT %: 67.3 % (ref 47–73)
NRBC BLD QL AUTO: 0 10*3/UL (ref 0–0)
PH UR STRIP: 7 [PH] (ref 4.5–8)
PLATELET # BLD AUTO: 147 10*3/UL (ref 130–400)
PMV BLD AUTO: 12.6 FL (ref 9.6–12.3)
POTASSIUM SERPL-SCNC: 4.4 MMOL/L (ref 3.4–5.1)
RBC # BLD AUTO: 4.94 10*6/UL (ref 4.1–5.1)
RBC #/AREA URNS HPF: (no result) RBC/HPF (ref 0–2)
SP GR UR: 1.02 (ref 1–1.03)
URATE SERPL-MCNC: 3.8 MG/DL (ref 3.1–7.8)
UROBILINOGEN UR STRIP-MCNC: 1 E.U./DL (ref 0–1)
WBC #/AREA URNS HPF: (no result) WBC/HPF (ref 0–5)
WBC NRBC COR # BLD AUTO: 6.6 10*3/UL (ref 4.8–10.8)

## 2023-09-13 ENCOUNTER — HOSPITAL ENCOUNTER (OUTPATIENT)
Dept: HOSPITAL 83 - MAMMO | Age: 68
Discharge: HOME | End: 2023-09-13
Attending: STUDENT IN AN ORGANIZED HEALTH CARE EDUCATION/TRAINING PROGRAM
Payer: COMMERCIAL

## 2023-09-13 DIAGNOSIS — Z12.31: Primary | ICD-10-CM

## 2023-09-28 ENCOUNTER — OFFICE VISIT (OUTPATIENT)
Dept: CARDIOLOGY CLINIC | Age: 68
End: 2023-09-28

## 2023-09-28 VITALS
WEIGHT: 216 LBS | HEART RATE: 54 BPM | DIASTOLIC BLOOD PRESSURE: 74 MMHG | BODY MASS INDEX: 34.72 KG/M2 | RESPIRATION RATE: 18 BRPM | HEIGHT: 66 IN | SYSTOLIC BLOOD PRESSURE: 136 MMHG

## 2023-09-28 DIAGNOSIS — Z94.0 KIDNEY TRANSPLANT RECIPIENT: ICD-10-CM

## 2023-09-28 DIAGNOSIS — I48.0 PAROXYSMAL ATRIAL FIBRILLATION (HCC): ICD-10-CM

## 2023-09-28 DIAGNOSIS — R06.02 SHORTNESS OF BREATH: ICD-10-CM

## 2023-09-28 DIAGNOSIS — R00.1 SINUS BRADYCARDIA: ICD-10-CM

## 2023-09-28 DIAGNOSIS — Z95.1 STATUS POST CORONARY ARTERY BYPASS GRAFT: ICD-10-CM

## 2023-09-28 DIAGNOSIS — Z87.898 HX OF DIZZINESS: ICD-10-CM

## 2023-09-28 DIAGNOSIS — I10 ESSENTIAL HYPERTENSION: ICD-10-CM

## 2023-09-28 DIAGNOSIS — E78.5 DYSLIPIDEMIA: ICD-10-CM

## 2023-09-28 DIAGNOSIS — I25.10 CORONARY ARTERY DISEASE INVOLVING NATIVE CORONARY ARTERY OF NATIVE HEART WITHOUT ANGINA PECTORIS: Primary | ICD-10-CM

## 2023-09-28 RX ORDER — FUROSEMIDE 20 MG/1
20 TABLET ORAL DAILY
COMMUNITY
Start: 2023-08-21

## 2023-09-28 RX ORDER — MECLIZINE HCL 12.5 MG/1
TABLET ORAL
COMMUNITY
Start: 2023-07-18

## 2023-09-28 RX ORDER — VERAPAMIL HYDROCHLORIDE 40 MG/1
TABLET ORAL
COMMUNITY
Start: 2023-08-18 | End: 2023-09-28 | Stop reason: SINTOL

## 2023-09-28 NOTE — PATIENT INSTRUCTIONS
Discontinue Verapamil due to low heart rates  Monitor BP, call Dr Pranav Craig if top number >140  Call for chest pain

## 2023-11-20 ENCOUNTER — HOSPITAL ENCOUNTER (OUTPATIENT)
Dept: HOSPITAL 83 - RESCLI | Age: 68
Discharge: HOME | End: 2023-11-20
Attending: INTERNAL MEDICINE
Payer: COMMERCIAL

## 2023-11-20 DIAGNOSIS — G43.909: ICD-10-CM

## 2023-11-20 DIAGNOSIS — R26.2: ICD-10-CM

## 2023-11-20 DIAGNOSIS — I48.0: ICD-10-CM

## 2023-11-20 DIAGNOSIS — K59.00: ICD-10-CM

## 2023-11-20 DIAGNOSIS — I11.0: ICD-10-CM

## 2023-11-20 DIAGNOSIS — E55.9: ICD-10-CM

## 2023-11-20 DIAGNOSIS — Z94.0: ICD-10-CM

## 2023-11-20 DIAGNOSIS — E78.5: ICD-10-CM

## 2023-11-20 DIAGNOSIS — E03.9: ICD-10-CM

## 2023-11-20 DIAGNOSIS — H81.10: ICD-10-CM

## 2023-11-20 DIAGNOSIS — Z79.899: ICD-10-CM

## 2023-11-20 DIAGNOSIS — E11.9: ICD-10-CM

## 2023-11-20 DIAGNOSIS — Z23: Primary | ICD-10-CM

## 2023-11-20 DIAGNOSIS — I50.32: ICD-10-CM

## 2023-11-20 DIAGNOSIS — I25.10: ICD-10-CM

## 2023-11-20 LAB
25(OH)D3 SERPL-MCNC: 38.9 NG/ML (ref 30–100)
BACTERIA #/AREA URNS HPF: (no result) /[HPF]
BASOPHILS # BLD AUTO: 0 10*3/UL (ref 0–0.1)
BASOPHILS NFR BLD AUTO: 0.5 % (ref 0–1)
BUN SERPL-MCNC: 13 MG/DL (ref 9–23)
CASTS URNS QL MICRO: (no result)
CHLORIDE SERPL-SCNC: 104 MMOL/L (ref 98–107)
EOSINOPHIL # BLD AUTO: 0.1 10*3/UL (ref 0–0.4)
EOSINOPHIL # BLD AUTO: 1.1 % (ref 1–4)
EPI CELLS #/AREA URNS HPF: (no result) /[HPF]
ERYTHROCYTE [DISTWIDTH] IN BLOOD BY AUTOMATED COUNT: 14 % (ref 0–14.5)
HCT VFR BLD AUTO: 45 % (ref 37–47)
LYMPHOCYTES # BLD AUTO: 0.9 10*3/UL (ref 1.3–4.4)
LYMPHOCYTES NFR BLD AUTO: 13.8 % (ref 27–41)
MCH RBC QN AUTO: 26.6 PG (ref 27–31)
MCHC RBC AUTO-ENTMCNC: 30.7 G/DL (ref 33–37)
MCV RBC AUTO: 86.9 FL (ref 81–99)
MONOCYTES # BLD AUTO: 0.6 10*3/UL (ref 0.1–1)
MONOCYTES NFR BLD MANUAL: 9.2 % (ref 3–9)
NEUT #: 4.7 10*3/UL (ref 2.3–7.9)
NEUT %: 75.1 % (ref 47–73)
NRBC BLD QL AUTO: 0 10*3/UL (ref 0–0)
PH UR STRIP: 6 [PH] (ref 4.5–8)
PLATELET # BLD AUTO: 151 10*3/UL (ref 130–400)
PMV BLD AUTO: 12 FL (ref 9.6–12.3)
POTASSIUM SERPL-SCNC: 4.7 MMOL/L (ref 3.4–5.1)
RBC # BLD AUTO: 5.18 10*6/UL (ref 4.1–5.1)
RBC #/AREA URNS HPF: (no result) RBC/HPF (ref 0–2)
SP GR UR: 1.02 (ref 1–1.03)
URATE SERPL-MCNC: 3.7 MG/DL (ref 3.1–7.8)
UROBILINOGEN UR STRIP-MCNC: 1 E.U./DL (ref 0–1)
WBC #/AREA URNS HPF: (no result) WBC/HPF (ref 0–5)
WBC NRBC COR # BLD AUTO: 6.2 10*3/UL (ref 4.8–10.8)

## 2023-12-04 ENCOUNTER — HOSPITAL ENCOUNTER (OUTPATIENT)
Dept: HOSPITAL 83 - LAB | Age: 68
Discharge: HOME | End: 2023-12-04
Payer: COMMERCIAL

## 2023-12-04 DIAGNOSIS — E11.9: Primary | ICD-10-CM

## 2024-01-18 ENCOUNTER — HOSPITAL ENCOUNTER (EMERGENCY)
Dept: HOSPITAL 83 - ED | Age: 69
Discharge: HOME | End: 2024-01-18
Payer: COMMERCIAL

## 2024-01-18 VITALS — WEIGHT: 211 LBS | BODY MASS INDEX: 35.16 KG/M2 | HEIGHT: 65 IN

## 2024-01-18 DIAGNOSIS — Z86.718: ICD-10-CM

## 2024-01-18 DIAGNOSIS — I25.2: ICD-10-CM

## 2024-01-18 DIAGNOSIS — Z98.890: ICD-10-CM

## 2024-01-18 DIAGNOSIS — Z95.5: ICD-10-CM

## 2024-01-18 DIAGNOSIS — E11.9: ICD-10-CM

## 2024-01-18 DIAGNOSIS — N95.0: Primary | ICD-10-CM

## 2024-01-18 DIAGNOSIS — Z88.8: ICD-10-CM

## 2024-01-18 DIAGNOSIS — M10.9: ICD-10-CM

## 2024-01-18 DIAGNOSIS — I25.10: ICD-10-CM

## 2024-01-18 DIAGNOSIS — Z90.49: ICD-10-CM

## 2024-01-18 DIAGNOSIS — Z88.6: ICD-10-CM

## 2024-01-18 DIAGNOSIS — Z79.899: ICD-10-CM

## 2024-01-18 DIAGNOSIS — Z90.89: ICD-10-CM

## 2024-01-18 DIAGNOSIS — I10: ICD-10-CM

## 2024-01-18 DIAGNOSIS — D68.318: ICD-10-CM

## 2024-01-18 LAB
ALP SERPL-CCNC: 106 U/L (ref 46–116)
ALT SERPL W P-5'-P-CCNC: 36 U/L (ref 5–49)
APTT PPP: 39.4 SECONDS (ref 20–32.1)
BACTERIA #/AREA URNS HPF: (no result) /[HPF]
BASOPHILS # BLD AUTO: 0.1 10*3/UL (ref 0–0.1)
BASOPHILS NFR BLD AUTO: 0.6 % (ref 0–1)
BUN SERPL-MCNC: 17 MG/DL (ref 9–23)
CASTS URNS QL MICRO: (no result)
CHLORIDE SERPL-SCNC: 106 MMOL/L (ref 98–107)
EOSINOPHIL # BLD AUTO: 0.1 10*3/UL (ref 0–0.4)
EOSINOPHIL # BLD AUTO: 1.2 % (ref 1–4)
EPI CELLS #/AREA URNS HPF: (no result) /[HPF]
ERYTHROCYTE [DISTWIDTH] IN BLOOD BY AUTOMATED COUNT: 14.2 % (ref 0–14.5)
HCT VFR BLD AUTO: 45.3 % (ref 37–47)
HGB UR QL STRIP: (no result)
LYMPHOCYTES # BLD AUTO: 1.2 10*3/UL (ref 1.3–4.4)
LYMPHOCYTES NFR BLD AUTO: 14 % (ref 27–41)
MCH RBC QN AUTO: 26.6 PG (ref 27–31)
MCHC RBC AUTO-ENTMCNC: 30.7 G/DL (ref 33–37)
MCV RBC AUTO: 86.6 FL (ref 81–99)
MONOCYTES # BLD AUTO: 0.7 10*3/UL (ref 0.1–1)
MONOCYTES NFR BLD MANUAL: 7.9 % (ref 3–9)
NEUT #: 6.5 10*3/UL (ref 2.3–7.9)
NEUT %: 76 % (ref 47–73)
NRBC BLD QL AUTO: 0 % (ref 0–0)
PH UR STRIP: 5.5 [PH] (ref 4.5–8)
PLATELET # BLD AUTO: 164 10*3/UL (ref 130–400)
PMV BLD AUTO: 12 FL (ref 9.6–12.3)
POTASSIUM SERPL-SCNC: 4.5 MMOL/L (ref 3.4–5.1)
PROT SERPL-MCNC: 6.7 GM/DL (ref 6–8)
RBC # BLD AUTO: 5.23 10*6/UL (ref 4.1–5.1)
SP GR UR: 1.01 (ref 1–1.03)
UROBILINOGEN UR STRIP-MCNC: 0.2 E.U./DL (ref 0–1)
WBC #/AREA URNS HPF: (no result) WBC/HPF (ref 0–5)
WBC NRBC COR # BLD AUTO: 8.6 10*3/UL (ref 4.8–10.8)

## 2024-01-24 ENCOUNTER — HOSPITAL ENCOUNTER (OUTPATIENT)
Dept: HOSPITAL 83 - LAB | Age: 69
Discharge: HOME | End: 2024-01-24
Attending: INTERNAL MEDICINE
Payer: COMMERCIAL

## 2024-01-24 DIAGNOSIS — Z94.0: Primary | ICD-10-CM

## 2024-01-24 LAB
ALP SERPL-CCNC: 91 U/L (ref 46–116)
ALT SERPL W P-5'-P-CCNC: 38 U/L (ref 5–49)
BASOPHILS # BLD AUTO: 0 10*3/UL (ref 0–0.1)
BASOPHILS NFR BLD AUTO: 0.4 % (ref 0–1)
BUN SERPL-MCNC: 16 MG/DL (ref 9–23)
CHLORIDE SERPL-SCNC: 107 MMOL/L (ref 98–107)
EOSINOPHIL # BLD AUTO: 0.1 10*3/UL (ref 0–0.4)
EOSINOPHIL # BLD AUTO: 1 % (ref 1–4)
ERYTHROCYTE [DISTWIDTH] IN BLOOD BY AUTOMATED COUNT: 13.9 % (ref 0–14.5)
HCT VFR BLD AUTO: 41.5 % (ref 37–47)
LYMPHOCYTES # BLD AUTO: 1.1 10*3/UL (ref 1.3–4.4)
LYMPHOCYTES NFR BLD AUTO: 13.4 % (ref 27–41)
MCH RBC QN AUTO: 26.5 PG (ref 27–31)
MCHC RBC AUTO-ENTMCNC: 30.4 G/DL (ref 33–37)
MCV RBC AUTO: 87.2 FL (ref 81–99)
MONOCYTES # BLD AUTO: 0.9 10*3/UL (ref 0.1–1)
MONOCYTES NFR BLD MANUAL: 10.4 % (ref 3–9)
NEUT #: 6 10*3/UL (ref 2.3–7.9)
NEUT %: 73.2 % (ref 47–73)
NRBC BLD QL AUTO: 0 % (ref 0–0)
PLATELET # BLD AUTO: 150 10*3/UL (ref 130–400)
PMV BLD AUTO: 12.8 FL (ref 9.6–12.3)
POTASSIUM SERPL-SCNC: 4.2 MMOL/L (ref 3.4–5.1)
PROT SERPL-MCNC: 6.1 GM/DL (ref 6–8)
RBC # BLD AUTO: 4.76 10*6/UL (ref 4.1–5.1)
URATE SERPL-MCNC: 3.7 MG/DL (ref 3.1–7.8)
WBC NRBC COR # BLD AUTO: 8.2 10*3/UL (ref 4.8–10.8)

## 2024-02-19 ENCOUNTER — HOSPITAL ENCOUNTER (OUTPATIENT)
Dept: HOSPITAL 83 - RESCLI | Age: 69
Discharge: HOME | End: 2024-02-19
Attending: STUDENT IN AN ORGANIZED HEALTH CARE EDUCATION/TRAINING PROGRAM
Payer: COMMERCIAL

## 2024-02-19 DIAGNOSIS — E11.9: Primary | ICD-10-CM

## 2024-02-19 DIAGNOSIS — I25.10: ICD-10-CM

## 2024-02-19 DIAGNOSIS — F32.89: ICD-10-CM

## 2024-02-19 DIAGNOSIS — Z94.0: ICD-10-CM

## 2024-02-19 DIAGNOSIS — Z79.899: ICD-10-CM

## 2024-02-19 DIAGNOSIS — I48.0: ICD-10-CM

## 2024-02-19 DIAGNOSIS — E78.5: ICD-10-CM

## 2024-02-19 DIAGNOSIS — E55.9: ICD-10-CM

## 2024-02-19 DIAGNOSIS — I50.32: ICD-10-CM

## 2024-02-19 DIAGNOSIS — E03.9: ICD-10-CM

## 2024-02-19 DIAGNOSIS — H81.10: ICD-10-CM

## 2024-02-19 DIAGNOSIS — K59.00: ICD-10-CM

## 2024-02-19 DIAGNOSIS — I11.0: ICD-10-CM

## 2024-02-19 DIAGNOSIS — Z88.8: ICD-10-CM

## 2024-02-19 DIAGNOSIS — Z98.890: ICD-10-CM

## 2024-02-19 DIAGNOSIS — R26.2: ICD-10-CM

## 2024-02-19 LAB
BUN SERPL-MCNC: 13 MG/DL (ref 9–23)
CHLORIDE SERPL-SCNC: 104 MMOL/L (ref 98–107)
POTASSIUM SERPL-SCNC: 4.1 MMOL/L (ref 3.4–5.1)

## 2024-03-07 ENCOUNTER — OFFICE VISIT (OUTPATIENT)
Dept: CARDIOLOGY CLINIC | Age: 69
End: 2024-03-07
Payer: COMMERCIAL

## 2024-03-07 VITALS
WEIGHT: 210.2 LBS | SYSTOLIC BLOOD PRESSURE: 150 MMHG | DIASTOLIC BLOOD PRESSURE: 68 MMHG | BODY MASS INDEX: 35.02 KG/M2 | HEART RATE: 47 BPM | RESPIRATION RATE: 18 BRPM | HEIGHT: 65 IN

## 2024-03-07 DIAGNOSIS — Z95.1 STATUS POST CORONARY ARTERY BYPASS GRAFT: ICD-10-CM

## 2024-03-07 DIAGNOSIS — I48.0 PAROXYSMAL ATRIAL FIBRILLATION (HCC): ICD-10-CM

## 2024-03-07 DIAGNOSIS — Z94.0 KIDNEY TRANSPLANT RECIPIENT: ICD-10-CM

## 2024-03-07 DIAGNOSIS — E78.5 DYSLIPIDEMIA: ICD-10-CM

## 2024-03-07 DIAGNOSIS — I25.10 CORONARY ARTERY DISEASE INVOLVING NATIVE CORONARY ARTERY OF NATIVE HEART WITHOUT ANGINA PECTORIS: Primary | ICD-10-CM

## 2024-03-07 DIAGNOSIS — R00.1 SINUS BRADYCARDIA: ICD-10-CM

## 2024-03-07 PROCEDURE — 1036F TOBACCO NON-USER: CPT | Performed by: INTERNAL MEDICINE

## 2024-03-07 PROCEDURE — 1123F ACP DISCUSS/DSCN MKR DOCD: CPT | Performed by: INTERNAL MEDICINE

## 2024-03-07 PROCEDURE — 1090F PRES/ABSN URINE INCON ASSESS: CPT | Performed by: INTERNAL MEDICINE

## 2024-03-07 PROCEDURE — G8417 CALC BMI ABV UP PARAM F/U: HCPCS | Performed by: INTERNAL MEDICINE

## 2024-03-07 PROCEDURE — 3017F COLORECTAL CA SCREEN DOC REV: CPT | Performed by: INTERNAL MEDICINE

## 2024-03-07 PROCEDURE — 93000 ELECTROCARDIOGRAM COMPLETE: CPT | Performed by: INTERNAL MEDICINE

## 2024-03-07 PROCEDURE — G8400 PT W/DXA NO RESULTS DOC: HCPCS | Performed by: INTERNAL MEDICINE

## 2024-03-07 PROCEDURE — 99214 OFFICE O/P EST MOD 30 MIN: CPT | Performed by: INTERNAL MEDICINE

## 2024-03-07 PROCEDURE — G8427 DOCREV CUR MEDS BY ELIG CLIN: HCPCS | Performed by: INTERNAL MEDICINE

## 2024-03-07 PROCEDURE — G8484 FLU IMMUNIZE NO ADMIN: HCPCS | Performed by: INTERNAL MEDICINE

## 2024-03-07 NOTE — PATIENT INSTRUCTIONS
Stop Verapamil due to low heart rate  Get lab -TSH for your thyroid  Call for fatigue, dizzy  Monitor heart rates, call if <45

## 2024-03-07 NOTE — PROGRESS NOTES
OFFICE VISIT     PRIMARY CARE PHYSICIAN:      Haile Jett MD       ALLERGIES / SENSITIVITIES:        Allergies   Allergen Reactions    Latex     Adhesive Tape     Hydralazine Hives    Ibuprofen Other (See Comments)          REVIEWED MEDICATIONS:        Current Outpatient Medications:     furosemide (LASIX) 20 MG tablet, Take 1 tablet by mouth daily, Disp: , Rfl:     meclizine (ANTIVERT) 12.5 MG tablet, TAKE 1 TABLET BY MOUTH EVERY 8 HOURS AS NEEDED FOR DIZZINESS, Disp: , Rfl:     Sennosides (SENOKOT PO), Take 1 tablet by mouth as needed, Disp: , Rfl:     Cholecalciferol (VITAMIN D3) 50 MCG (2000 UT) TABS, Take 1 tablet by mouth daily, Disp: , Rfl:     docusate (COLACE, DULCOLAX) 100 MG CAPS, Take 100 mg by mouth 2 times daily as needed, Disp: , Rfl:     clopidogrel (PLAVIX) 75 MG tablet, Take 1 tablet by mouth daily, Disp: 30 tablet, Rfl: 3    sulfamethoxazole-trimethoprim (BACTRIM;SEPTRA) 400-80 MG per tablet, Take 1 tablet by mouth daily, Disp: , Rfl:     warfarin (COUMADIN) 2.5 MG tablet, Take 1 tablet by mouth daily 1 tablet daily, Disp: , Rfl:     MAGNESIUM-OXIDE 400 (240 Mg) MG tablet, Take 1 tablet by mouth in the morning, at noon, and at bedtime, Disp: , Rfl:     amiodarone (CORDARONE) 200 MG tablet, Take 1 tablet by mouth daily, Disp: , Rfl:     amitriptyline (ELAVIL) 10 MG tablet, TAKE 1 TABLET BY MOUTH AT BEDTIME FOR 90 DAYS, Disp: , Rfl:     atorvastatin (LIPITOR) 40 MG tablet, Take 1 tablet by mouth daily, Disp: , Rfl:     NOVOLOG FLEXPEN 100 UNIT/ML injection pen, INJECT SUBCUTANEOUSLY PER SLIDING SCALE, MAX DOSE 13 UNITS, Disp: , Rfl:     LEVEMIR FLEXTOUCH 100 UNIT/ML injection pen, INJECT 30 UNITS SUBCUTANEOUSLY TWICE DAILY, Disp: , Rfl:     EUTHYROX 25 MCG tablet, TAKE 1 TABLET BY MOUTH ONCE DAILY ON AN EMPTY STOMACH IN THE MORNING, Disp: , Rfl:     lisinopril (PRINIVIL;ZESTRIL) 2.5 MG tablet, TAKE 1 TABLET BY MOUTH ONCE DAILY FOR 90 DAYS, Disp: , Rfl:     mycophenolate (CELLCEPT) 500 MG

## 2024-03-12 ENCOUNTER — TELEPHONE (OUTPATIENT)
Dept: CARDIOLOGY CLINIC | Age: 69
End: 2024-03-12

## 2024-03-12 ENCOUNTER — HOSPITAL ENCOUNTER (OUTPATIENT)
Dept: HOSPITAL 83 - LAB | Age: 69
Discharge: HOME | End: 2024-03-12
Attending: INTERNAL MEDICINE
Payer: COMMERCIAL

## 2024-03-12 DIAGNOSIS — N18.6: ICD-10-CM

## 2024-03-12 DIAGNOSIS — R00.1: ICD-10-CM

## 2024-03-12 DIAGNOSIS — I48.0: Primary | ICD-10-CM

## 2024-03-12 DIAGNOSIS — Z94.0: ICD-10-CM

## 2024-03-12 LAB
ALP SERPL-CCNC: 92 U/L (ref 46–116)
ALT SERPL W P-5'-P-CCNC: 50 U/L (ref 5–49)
BASOPHILS # BLD AUTO: 0 10*3/UL (ref 0–0.1)
BASOPHILS NFR BLD AUTO: 0.5 % (ref 0–1)
BUN SERPL-MCNC: 16 MG/DL (ref 9–23)
CHLORIDE SERPL-SCNC: 107 MMOL/L (ref 98–107)
EOSINOPHIL # BLD AUTO: 0.1 10*3/UL (ref 0–0.4)
EOSINOPHIL # BLD AUTO: 1.4 % (ref 1–4)
ERYTHROCYTE [DISTWIDTH] IN BLOOD BY AUTOMATED COUNT: 14.2 % (ref 0–14.5)
HCT VFR BLD AUTO: 46 % (ref 37–47)
LYMPHOCYTES # BLD AUTO: 1.5 10*3/UL (ref 1.3–4.4)
LYMPHOCYTES NFR BLD AUTO: 22.7 % (ref 27–41)
MCH RBC QN AUTO: 25.8 PG (ref 27–31)
MCHC RBC AUTO-ENTMCNC: 29.8 G/DL (ref 33–37)
MCV RBC AUTO: 86.6 FL (ref 81–99)
MONOCYTES # BLD AUTO: 0.7 10*3/UL (ref 0.1–1)
MONOCYTES NFR BLD MANUAL: 10.3 % (ref 3–9)
NEUT #: 4.2 10*3/UL (ref 2.3–7.9)
NEUT %: 64.6 % (ref 47–73)
NRBC BLD QL AUTO: 0 % (ref 0–0)
PLATELET # BLD AUTO: 159 10*3/UL (ref 130–400)
PMV BLD AUTO: 12.5 FL (ref 9.6–12.3)
POTASSIUM SERPL-SCNC: 4.5 MMOL/L (ref 3.4–5.1)
PROT SERPL-MCNC: 6.5 GM/DL (ref 6–8)
RBC # BLD AUTO: 5.31 10*6/UL (ref 4.1–5.1)
TSH SERPL DL<=0.05 MIU/L-ACNC: 13.4 UIU/ML (ref 0.55–4.78)
URATE SERPL-MCNC: 4.3 MG/DL (ref 3.1–7.8)
WBC NRBC COR # BLD AUTO: 6.5 10*3/UL (ref 4.8–10.8)

## 2024-04-10 ENCOUNTER — HOSPITAL ENCOUNTER (OUTPATIENT)
Dept: HOSPITAL 83 - LAB | Age: 69
Discharge: HOME | End: 2024-04-10
Attending: INTERNAL MEDICINE
Payer: COMMERCIAL

## 2024-04-10 DIAGNOSIS — Z94.0: Primary | ICD-10-CM

## 2024-04-10 LAB
ALP SERPL-CCNC: 94 U/L (ref 46–116)
ALT SERPL W P-5'-P-CCNC: 26 U/L (ref 5–49)
BASOPHILS # BLD AUTO: 0 10*3/UL (ref 0–0.1)
BASOPHILS NFR BLD AUTO: 0.6 % (ref 0–1)
BUN SERPL-MCNC: 21 MG/DL (ref 9–23)
CHLORIDE SERPL-SCNC: 105 MMOL/L (ref 98–107)
EOSINOPHIL # BLD AUTO: 0.1 10*3/UL (ref 0–0.4)
EOSINOPHIL # BLD AUTO: 0.9 % (ref 1–4)
ERYTHROCYTE [DISTWIDTH] IN BLOOD BY AUTOMATED COUNT: 14.4 % (ref 0–14.5)
HCT VFR BLD AUTO: 46.2 % (ref 37–47)
LYMPHOCYTES # BLD AUTO: 1 10*3/UL (ref 1.3–4.4)
LYMPHOCYTES NFR BLD AUTO: 15.5 % (ref 27–41)
MCH RBC QN AUTO: 25.8 PG (ref 27–31)
MCHC RBC AUTO-ENTMCNC: 29.9 G/DL (ref 33–37)
MCV RBC AUTO: 86.4 FL (ref 81–99)
MONOCYTES # BLD AUTO: 0.6 10*3/UL (ref 0.1–1)
MONOCYTES NFR BLD MANUAL: 9.5 % (ref 3–9)
NEUT #: 4.7 10*3/UL (ref 2.3–7.9)
NEUT %: 73.2 % (ref 47–73)
NRBC BLD QL AUTO: 0 10*3/UL (ref 0–0)
PLATELET # BLD AUTO: 168 10*3/UL (ref 130–400)
PMV BLD AUTO: 12.9 FL (ref 9.6–12.3)
POTASSIUM SERPL-SCNC: 4.8 MMOL/L (ref 3.4–5.1)
PROT SERPL-MCNC: 6.6 GM/DL (ref 6–8)
RBC # BLD AUTO: 5.35 10*6/UL (ref 4.1–5.1)
URATE SERPL-MCNC: 3.8 MG/DL (ref 3.1–7.8)
WBC NRBC COR # BLD AUTO: 6.4 10*3/UL (ref 4.8–10.8)

## 2024-04-23 ENCOUNTER — HOSPITAL ENCOUNTER (OUTPATIENT)
Dept: HOSPITAL 83 - RESCLI | Age: 69
Discharge: HOME | End: 2024-04-23
Attending: INTERNAL MEDICINE
Payer: COMMERCIAL

## 2024-04-23 DIAGNOSIS — I50.32: ICD-10-CM

## 2024-04-23 DIAGNOSIS — H81.10: ICD-10-CM

## 2024-04-23 DIAGNOSIS — E03.9: ICD-10-CM

## 2024-04-23 DIAGNOSIS — E11.9: ICD-10-CM

## 2024-04-23 DIAGNOSIS — R26.2: ICD-10-CM

## 2024-04-23 DIAGNOSIS — I48.0: ICD-10-CM

## 2024-04-23 DIAGNOSIS — I11.0: Primary | ICD-10-CM

## 2024-04-23 DIAGNOSIS — Z94.0: ICD-10-CM

## 2024-04-23 DIAGNOSIS — F32.89: ICD-10-CM

## 2024-04-23 DIAGNOSIS — Z98.890: ICD-10-CM

## 2024-04-23 DIAGNOSIS — E78.5: ICD-10-CM

## 2024-04-23 DIAGNOSIS — Z79.899: ICD-10-CM

## 2024-04-23 DIAGNOSIS — Z88.8: ICD-10-CM

## 2024-04-23 DIAGNOSIS — E55.9: ICD-10-CM

## 2024-05-09 ENCOUNTER — TELEPHONE (OUTPATIENT)
Dept: CARDIOLOGY CLINIC | Age: 69
End: 2024-05-09

## 2024-05-09 NOTE — TELEPHONE ENCOUNTER
Patient need cardiac clearance for robtic assisted total laparoscopic hysterectomy bilateral. Date of procedure unknown. If any medications need held prior to procedure. Last seen in office 3/7/2024.     Please advise   [phone]

## 2024-05-16 ENCOUNTER — HOSPITAL ENCOUNTER (OUTPATIENT)
Dept: HOSPITAL 83 - LAB | Age: 69
Discharge: HOME | End: 2024-05-16
Attending: INTERNAL MEDICINE
Payer: COMMERCIAL

## 2024-05-16 DIAGNOSIS — D63.1: ICD-10-CM

## 2024-05-16 DIAGNOSIS — N25.81: ICD-10-CM

## 2024-05-16 DIAGNOSIS — E03.9: Primary | ICD-10-CM

## 2024-05-16 DIAGNOSIS — N18.9: ICD-10-CM

## 2024-05-16 LAB
25(OH)D3 SERPL-MCNC: 42 NG/ML (ref 30–100)
T4 FREE SERPL-MCNC: 0.78 NG/DL (ref 0.89–1.76)

## 2024-05-20 ENCOUNTER — HOSPITAL ENCOUNTER (OUTPATIENT)
Dept: HOSPITAL 83 - RESCLI | Age: 69
Discharge: HOME | End: 2024-05-20
Attending: STUDENT IN AN ORGANIZED HEALTH CARE EDUCATION/TRAINING PROGRAM
Payer: COMMERCIAL

## 2024-05-20 DIAGNOSIS — I50.32: ICD-10-CM

## 2024-05-20 DIAGNOSIS — K59.00: ICD-10-CM

## 2024-05-20 DIAGNOSIS — Z88.8: ICD-10-CM

## 2024-05-20 DIAGNOSIS — C54.1: ICD-10-CM

## 2024-05-20 DIAGNOSIS — E55.9: ICD-10-CM

## 2024-05-20 DIAGNOSIS — E78.5: ICD-10-CM

## 2024-05-20 DIAGNOSIS — Z79.899: ICD-10-CM

## 2024-05-20 DIAGNOSIS — Z86.16: ICD-10-CM

## 2024-05-20 DIAGNOSIS — E03.9: ICD-10-CM

## 2024-05-20 DIAGNOSIS — Z98.890: ICD-10-CM

## 2024-05-20 DIAGNOSIS — Z82.49: ICD-10-CM

## 2024-05-20 DIAGNOSIS — R26.2: ICD-10-CM

## 2024-05-20 DIAGNOSIS — F32.89: ICD-10-CM

## 2024-05-20 DIAGNOSIS — I11.0: ICD-10-CM

## 2024-05-20 DIAGNOSIS — Z94.0: ICD-10-CM

## 2024-05-20 DIAGNOSIS — H81.10: ICD-10-CM

## 2024-05-20 DIAGNOSIS — I25.10: ICD-10-CM

## 2024-05-20 DIAGNOSIS — I48.0: ICD-10-CM

## 2024-05-20 DIAGNOSIS — E11.9: Primary | ICD-10-CM

## 2024-05-20 LAB
BUN SERPL-MCNC: 18 MG/DL (ref 9–23)
CHLORIDE SERPL-SCNC: 103 MMOL/L (ref 98–107)
POTASSIUM SERPL-SCNC: 4.4 MMOL/L (ref 3.4–5.1)

## 2024-05-31 DIAGNOSIS — R00.1 SINUS BRADYCARDIA: ICD-10-CM

## 2024-05-31 DIAGNOSIS — I48.0 PAROXYSMAL ATRIAL FIBRILLATION (HCC): ICD-10-CM

## 2024-06-04 ENCOUNTER — OFFICE VISIT (OUTPATIENT)
Dept: CARDIOLOGY CLINIC | Age: 69
End: 2024-06-04

## 2024-06-04 VITALS
HEART RATE: 56 BPM | HEIGHT: 66 IN | BODY MASS INDEX: 33.59 KG/M2 | RESPIRATION RATE: 18 BRPM | DIASTOLIC BLOOD PRESSURE: 74 MMHG | SYSTOLIC BLOOD PRESSURE: 170 MMHG | WEIGHT: 209 LBS

## 2024-06-04 DIAGNOSIS — I10 ESSENTIAL HYPERTENSION: ICD-10-CM

## 2024-06-04 DIAGNOSIS — Z94.0 KIDNEY TRANSPLANT RECIPIENT: ICD-10-CM

## 2024-06-04 DIAGNOSIS — I38 VHD (VALVULAR HEART DISEASE): ICD-10-CM

## 2024-06-04 DIAGNOSIS — Z95.1 STATUS POST CORONARY ARTERY BYPASS GRAFT: ICD-10-CM

## 2024-06-04 DIAGNOSIS — I25.10 CORONARY ARTERY DISEASE INVOLVING NATIVE CORONARY ARTERY OF NATIVE HEART WITHOUT ANGINA PECTORIS: Primary | ICD-10-CM

## 2024-06-04 DIAGNOSIS — R42 DIZZINESS: ICD-10-CM

## 2024-06-04 RX ORDER — HYDROCHLOROTHIAZIDE 25 MG/1
25 TABLET ORAL EVERY MORNING
Qty: 30 TABLET | Refills: 3 | Status: SHIPPED | OUTPATIENT
Start: 2024-06-04

## 2024-06-04 RX ORDER — LEVOTHYROXINE SODIUM 0.03 MG/1
25 TABLET ORAL DAILY
COMMUNITY

## 2024-06-04 NOTE — PROGRESS NOTES
tests (as available) were reviewed.   All questions answered about cardiac diagnoses and cardiac medications.   Continue current medications. Monitor BP and heart rates.              Compliance with medications and f/u with physicians discussed.   Risk factor modification based on risk profile discussed.   Advised to call for exertional chest pains, short of breath; dizzy or palpitations.   Follow up in 6 months or earlier if needed.         [facility] Cardiology  [address] [place] [address]  [phone]

## 2024-06-04 NOTE — PATIENT INSTRUCTIONS
Call for chest pain   Take new medication HCTZ 25mg for BP  Monitor BP, call Dr Ruiz if top number >140  Talk to your family doctor about your thyroid tests/medications  Cleared for your surgery on 6/11/24

## 2024-08-15 ENCOUNTER — HOSPITAL ENCOUNTER (OUTPATIENT)
Dept: HOSPITAL 83 - LAB | Age: 69
Discharge: HOME | End: 2024-08-15
Attending: INTERNAL MEDICINE
Payer: COMMERCIAL

## 2024-08-15 DIAGNOSIS — E11.21: ICD-10-CM

## 2024-08-15 DIAGNOSIS — E11.22: Primary | ICD-10-CM

## 2024-08-15 DIAGNOSIS — Z94.0: ICD-10-CM

## 2024-08-15 DIAGNOSIS — D63.1: ICD-10-CM

## 2024-08-15 DIAGNOSIS — E55.9: ICD-10-CM

## 2024-08-15 DIAGNOSIS — N18.9: ICD-10-CM

## 2024-08-15 LAB
25(OH)D3 SERPL-MCNC: 48.5 NG/ML (ref 30–100)
BACTERIA #/AREA URNS HPF: (no result) /[HPF]
BASOPHILS # BLD AUTO: 0.1 10*3/UL (ref 0–0.1)
BASOPHILS NFR BLD AUTO: 0.7 % (ref 0–1)
BUN SERPL-MCNC: 24 MG/DL (ref 9–23)
CHLORIDE SERPL-SCNC: 101 MMOL/L (ref 98–107)
CREAT UR-MCNC: 151.74 MG/DL
EOSINOPHIL # BLD AUTO: 0.1 10*3/UL (ref 0–0.4)
EOSINOPHIL # BLD AUTO: 1.1 % (ref 1–4)
EPI CELLS #/AREA URNS HPF: (no result) /[HPF]
ERYTHROCYTE [DISTWIDTH] IN BLOOD BY AUTOMATED COUNT: 14.3 % (ref 0–14.5)
GLUCOSE UR QL: (no result)
HCT VFR BLD AUTO: 46.9 % (ref 37–47)
LYMPHOCYTES # BLD AUTO: 1.2 10*3/UL (ref 1.3–4.4)
LYMPHOCYTES NFR BLD AUTO: 16.3 % (ref 27–41)
MCH RBC QN AUTO: 27.5 PG (ref 27–31)
MCHC RBC AUTO-ENTMCNC: 32.2 G/DL (ref 33–37)
MCV RBC AUTO: 85.3 FL (ref 81–99)
MONOCYTES # BLD AUTO: 0.6 10*3/UL (ref 0.1–1)
MONOCYTES NFR BLD MANUAL: 8.6 % (ref 3–9)
NEUT #: 5.4 10*3/UL (ref 2.3–7.9)
NEUT %: 73 % (ref 47–73)
NRBC BLD QL AUTO: 0 10*3/UL (ref 0–0)
PH UR STRIP: 6 [PH] (ref 4.5–8)
PLATELET # BLD AUTO: 155 10*3/UL (ref 130–400)
PMV BLD AUTO: 12.4 FL (ref 9.6–12.3)
POTASSIUM SERPL-SCNC: 3.6 MMOL/L (ref 3.4–5.1)
RBC # BLD AUTO: 5.5 10*6/UL (ref 4.1–5.1)
RBC #/AREA URNS HPF: (no result) RBC/HPF (ref 0–2)
SP GR UR: >= 1.03 (ref 1–1.03)
URATE SERPL-MCNC: 4.8 MG/DL (ref 3.1–7.8)
UROBILINOGEN UR STRIP-MCNC: 1 E.U./DL (ref 0–1)
WBC #/AREA URNS HPF: (no result) WBC/HPF (ref 0–5)
WBC NRBC COR # BLD AUTO: 7.3 10*3/UL (ref 4.8–10.8)

## 2024-08-26 ENCOUNTER — HOSPITAL ENCOUNTER (OUTPATIENT)
Dept: HOSPITAL 83 - RESCLI | Age: 69
Discharge: HOME | End: 2024-08-26
Attending: INTERNAL MEDICINE
Payer: COMMERCIAL

## 2024-08-26 DIAGNOSIS — Z79.01: ICD-10-CM

## 2024-08-26 DIAGNOSIS — E03.9: ICD-10-CM

## 2024-08-26 DIAGNOSIS — I50.32: ICD-10-CM

## 2024-08-26 DIAGNOSIS — Z86.16: ICD-10-CM

## 2024-08-26 DIAGNOSIS — K59.00: ICD-10-CM

## 2024-08-26 DIAGNOSIS — Z79.02: ICD-10-CM

## 2024-08-26 DIAGNOSIS — Z79.899: ICD-10-CM

## 2024-08-26 DIAGNOSIS — I48.0: ICD-10-CM

## 2024-08-26 DIAGNOSIS — I25.10: ICD-10-CM

## 2024-08-26 DIAGNOSIS — C54.1: ICD-10-CM

## 2024-08-26 DIAGNOSIS — Z94.0: ICD-10-CM

## 2024-08-26 DIAGNOSIS — H81.10: ICD-10-CM

## 2024-08-26 DIAGNOSIS — E55.9: ICD-10-CM

## 2024-08-26 DIAGNOSIS — Z79.82: ICD-10-CM

## 2024-08-26 DIAGNOSIS — Z12.39: ICD-10-CM

## 2024-08-26 DIAGNOSIS — E11.9: Primary | ICD-10-CM

## 2024-08-26 DIAGNOSIS — I11.0: ICD-10-CM

## 2024-08-26 DIAGNOSIS — M81.0: ICD-10-CM

## 2024-08-26 DIAGNOSIS — F32.89: ICD-10-CM

## 2024-08-26 LAB
CHOLEST SERPL-MCNC: 197 MG/DL (ref ?–200)
LDLC SERPL DIRECT ASSAY-MCNC: 109 MG/DL (ref 9–159)
T4 FREE SERPL-MCNC: 1.05 NG/DL (ref 0.89–1.76)
TRIGL SERPL-MCNC: 185 MG/DL (ref ?–150)

## 2024-09-25 RX ORDER — HYDROCHLOROTHIAZIDE 25 MG/1
25 TABLET ORAL EVERY MORNING
Qty: 90 TABLET | Refills: 3 | Status: SHIPPED | OUTPATIENT
Start: 2024-09-25

## 2024-10-08 ENCOUNTER — HOSPITAL ENCOUNTER (OUTPATIENT)
Dept: HOSPITAL 83 - LAB | Age: 69
Discharge: HOME | End: 2024-10-08
Attending: INTERNAL MEDICINE
Payer: COMMERCIAL

## 2024-10-08 DIAGNOSIS — N18.9: ICD-10-CM

## 2024-10-08 DIAGNOSIS — D63.1: ICD-10-CM

## 2024-10-08 DIAGNOSIS — N25.81: Primary | ICD-10-CM

## 2024-10-08 DIAGNOSIS — Z94.0: ICD-10-CM

## 2024-10-08 LAB
25(OH)D3 SERPL-MCNC: 49.1 NG/ML (ref 30–100)
BACTERIA #/AREA URNS HPF: (no result) /[HPF]
BASOPHILS # BLD AUTO: 2 % (ref 0–1)
BUN SERPL-MCNC: 20 MG/DL (ref 9–23)
BURR CELLS BLD QL SMEAR: (no result)
CHLORIDE SERPL-SCNC: 98 MMOL/L (ref 98–107)
CREAT UR-MCNC: 128.42 MG/DL
EPI CELLS #/AREA URNS HPF: (no result) /[HPF]
ERYTHROCYTE [DISTWIDTH] IN BLOOD BY AUTOMATED COUNT: 13.8 % (ref 0–14.5)
GLUCOSE UR QL: (no result)
HCT VFR BLD AUTO: 51.9 % (ref 37–47)
LEUKOCYTE ESTERASE UR QL STRIP: (no result)
MANUAL DIFFERENTIAL PERFORMED BLD QL: YES
MCH RBC QN AUTO: 27.8 PG (ref 27–31)
MCHC RBC AUTO-ENTMCNC: 31.4 G/DL (ref 33–37)
MCV RBC AUTO: 88.6 FL (ref 81–99)
NRBC BLD QL AUTO: 0 % (ref 0–0)
PH UR STRIP: 6 [PH] (ref 4.5–8)
PLATELET # BLD AUTO: 177 10*3/UL (ref 130–400)
PLATELET SUFFICIENCY: NORMAL
PMV BLD AUTO: 13.3 FL (ref 9.6–12.3)
POTASSIUM SERPL-SCNC: 3.4 MMOL/L (ref 3.4–5.1)
RBC # BLD AUTO: 5.86 10*6/UL (ref 4.1–5.1)
RBC #/AREA URNS HPF: (no result) RBC/HPF (ref 0–2)
SP GR UR: 1.02 (ref 1–1.03)
TOTAL CELLS COUNTED: 100 #CELLS
URATE SERPL-MCNC: 4.8 MG/DL (ref 3.1–7.8)
UROBILINOGEN UR STRIP-MCNC: 1 E.U./DL (ref 0–1)
WBC #/AREA URNS HPF: (no result) WBC/HPF (ref 0–5)
WBC NRBC COR # BLD AUTO: 10.3 10*3/UL (ref 4.8–10.8)

## 2024-12-17 ENCOUNTER — HOSPITAL ENCOUNTER (OUTPATIENT)
Dept: HOSPITAL 83 - LAB | Age: 69
Discharge: HOME | End: 2024-12-17
Attending: INTERNAL MEDICINE
Payer: COMMERCIAL

## 2024-12-17 DIAGNOSIS — D63.1: ICD-10-CM

## 2024-12-17 DIAGNOSIS — N25.81: Primary | ICD-10-CM

## 2024-12-17 DIAGNOSIS — Z94.0: ICD-10-CM

## 2024-12-17 LAB
25(OH)D3 SERPL-MCNC: 42.5 NG/ML (ref 30–100)
BASOPHILS # BLD AUTO: 0 10*3/UL (ref 0–0.1)
BASOPHILS NFR BLD AUTO: 0.5 % (ref 0–1)
BUN SERPL-MCNC: 21 MG/DL (ref 9–23)
CHLORIDE SERPL-SCNC: 98 MMOL/L (ref 98–107)
EOSINOPHIL # BLD AUTO: 0.1 10*3/UL (ref 0–0.4)
EOSINOPHIL # BLD AUTO: 1 % (ref 1–4)
EPI CELLS #/AREA URNS HPF: (no result) /[HPF]
ERYTHROCYTE [DISTWIDTH] IN BLOOD BY AUTOMATED COUNT: 13.2 % (ref 0–14.5)
GLUCOSE UR QL: (no result)
HCT VFR BLD AUTO: 48.9 % (ref 37–47)
MCH RBC QN AUTO: 28.4 PG (ref 27–31)
MCHC RBC AUTO-ENTMCNC: 31.9 G/DL (ref 33–37)
MCV RBC AUTO: 88.9 FL (ref 81–99)
MONOCYTES # BLD AUTO: 0.8 10*3/UL (ref 0.1–1)
MONOCYTES NFR BLD MANUAL: 9.4 % (ref 3–9)
NEUT #: 6.3 10*3/UL (ref 2.3–7.9)
NEUT %: 75.7 % (ref 47–73)
NRBC BLD QL AUTO: 0 10*3/UL (ref 0–0)
PH UR STRIP: 6 [PH] (ref 4.5–8)
PLATELET # BLD AUTO: 177 10*3/UL (ref 130–400)
PMV BLD AUTO: 12.2 FL (ref 9.6–12.3)
POTASSIUM SERPL-SCNC: 4.1 MMOL/L (ref 3.4–5.1)
RBC # BLD AUTO: 5.5 10*6/UL (ref 4.1–5.1)
RBC #/AREA URNS HPF: (no result) RBC/HPF (ref 0–2)
SP GR UR: >= 1.03 (ref 1–1.03)
URATE SERPL-MCNC: 4.1 MG/DL (ref 3.1–7.8)
UROBILINOGEN UR STRIP-MCNC: 1 E.U./DL (ref 0–1)
WBC #/AREA URNS HPF: (no result) WBC/HPF (ref 0–5)
WBC NRBC COR # BLD AUTO: 8.3 10*3/UL (ref 4.8–10.8)

## 2025-02-18 ENCOUNTER — HOSPITAL ENCOUNTER (OUTPATIENT)
Dept: HOSPITAL 83 - LAB | Age: 70
Discharge: HOME | End: 2025-02-18
Attending: INTERNAL MEDICINE
Payer: COMMERCIAL

## 2025-02-18 DIAGNOSIS — N18.9: Primary | ICD-10-CM

## 2025-02-18 DIAGNOSIS — N25.81: ICD-10-CM

## 2025-02-18 DIAGNOSIS — Z94.0: ICD-10-CM

## 2025-02-18 DIAGNOSIS — D63.1: ICD-10-CM

## 2025-02-18 LAB
25(OH)D3 SERPL-MCNC: 60.2 NG/ML (ref 30–100)
BACTERIA #/AREA URNS HPF: (no result) /[HPF]
BASOPHILS # BLD AUTO: 0 10*3/UL (ref 0–0.1)
BASOPHILS NFR BLD AUTO: 0.5 % (ref 0–1)
BUN SERPL-MCNC: 18 MG/DL (ref 9–23)
CHLORIDE SERPL-SCNC: 97 MMOL/L (ref 98–107)
EOSINOPHIL # BLD AUTO: 0.1 10*3/UL (ref 0–0.4)
EOSINOPHIL # BLD AUTO: 1.2 % (ref 1–4)
EPI CELLS #/AREA URNS HPF: (no result) /[HPF]
ERYTHROCYTE [DISTWIDTH] IN BLOOD BY AUTOMATED COUNT: 13.6 % (ref 0–14.5)
GLUCOSE UR QL: (no result)
HCT VFR BLD AUTO: 51.4 % (ref 37–47)
MCH RBC QN AUTO: 27.9 PG (ref 27–31)
MCHC RBC AUTO-ENTMCNC: 31.5 G/DL (ref 33–37)
MCV RBC AUTO: 88.5 FL (ref 81–99)
MONOCYTES # BLD AUTO: 0.8 10*3/UL (ref 0.1–1)
MONOCYTES NFR BLD MANUAL: 9.6 % (ref 3–9)
NEUT #: 5.5 10*3/UL (ref 2.3–7.9)
NEUT %: 69.9 % (ref 47–73)
NRBC BLD QL AUTO: 0 10*3/UL (ref 0–0)
PH UR STRIP: 7 [PH] (ref 4.5–8)
PLATELET # BLD AUTO: 186 10*3/UL (ref 130–400)
PMV BLD AUTO: 12.4 FL (ref 9.6–12.3)
POTASSIUM SERPL-SCNC: 3.6 MMOL/L (ref 3.4–5.1)
RBC # BLD AUTO: 5.81 10*6/UL (ref 4.1–5.1)
RBC #/AREA URNS HPF: (no result) RBC/HPF (ref 0–2)
SP GR UR: >= 1.03 (ref 1–1.03)
URATE SERPL-MCNC: 4.3 MG/DL (ref 3.1–7.8)
UROBILINOGEN UR STRIP-MCNC: 1 E.U./DL (ref 0–1)
WBC #/AREA URNS HPF: (no result) WBC/HPF (ref 0–5)
WBC NRBC COR # BLD AUTO: 7.8 10*3/UL (ref 4.8–10.8)

## 2025-07-17 ENCOUNTER — HOSPITAL ENCOUNTER (OUTPATIENT)
Dept: HOSPITAL 83 - RESCLI | Age: 70
Discharge: HOME | End: 2025-07-17
Attending: STUDENT IN AN ORGANIZED HEALTH CARE EDUCATION/TRAINING PROGRAM
Payer: COMMERCIAL

## 2025-07-17 DIAGNOSIS — I48.0: ICD-10-CM

## 2025-07-17 DIAGNOSIS — Z79.899: ICD-10-CM

## 2025-07-17 DIAGNOSIS — E03.9: Primary | ICD-10-CM

## 2025-07-17 DIAGNOSIS — Z88.8: ICD-10-CM

## 2025-07-17 DIAGNOSIS — Z94.0: ICD-10-CM

## 2025-07-17 DIAGNOSIS — F32.89: ICD-10-CM

## 2025-07-17 DIAGNOSIS — H81.10: ICD-10-CM

## 2025-07-17 DIAGNOSIS — E55.9: ICD-10-CM

## 2025-07-17 DIAGNOSIS — Z98.890: ICD-10-CM

## 2025-07-17 DIAGNOSIS — E11.9: ICD-10-CM

## 2025-07-17 DIAGNOSIS — I25.10: ICD-10-CM

## 2025-07-17 DIAGNOSIS — Z79.01: ICD-10-CM

## 2025-07-17 DIAGNOSIS — Z85.42: ICD-10-CM

## 2025-07-17 DIAGNOSIS — E78.2: ICD-10-CM

## 2025-07-17 DIAGNOSIS — I50.32: ICD-10-CM

## 2025-07-17 DIAGNOSIS — I11.0: ICD-10-CM

## 2025-07-17 DIAGNOSIS — Z91.199: ICD-10-CM

## 2025-07-17 DIAGNOSIS — Q87.81: ICD-10-CM

## 2025-07-18 ENCOUNTER — HOSPITAL ENCOUNTER (OUTPATIENT)
Dept: HOSPITAL 83 - LAB | Age: 70
Discharge: HOME | End: 2025-07-18
Attending: INTERNAL MEDICINE
Payer: COMMERCIAL

## 2025-07-18 DIAGNOSIS — E78.2: ICD-10-CM

## 2025-07-18 DIAGNOSIS — I48.0: ICD-10-CM

## 2025-07-18 DIAGNOSIS — E03.9: ICD-10-CM

## 2025-07-18 DIAGNOSIS — E11.9: Primary | ICD-10-CM

## 2025-07-18 LAB
25(OH)D3 SERPL-MCNC: 54.7 NG/ML (ref 30–100)
ACANTHOCYTES BLD QL SMEAR: (no result)
ALP SERPL-CCNC: 87 U/L (ref 46–116)
ALT SERPL W P-5'-P-CCNC: 47 U/L (ref 5–49)
APPEARANCE UR: CLEAR
AUER BODIES BLD QL SMEAR: (no result)
BACTERIA #/AREA URNS HPF: (no result) /[HPF]
BASO STIPL BLD QL SMEAR: (no result)
BASOPHILS # BLD AUTO: (no result) % (ref 0–1)
BASOPHILS # BLD AUTO: (no result) 10*3/UL (ref 0–0.1)
BASOPHILS NFR BLD AUTO: (no result) % (ref 0–1)
BILIRUB UR QL STRIP: NEGATIVE
BLASTS # BLD MANUAL: (no result) % (ref 0–0)
BUN SERPL-MCNC: 19 MG/DL (ref 9–23)
BUN SERPL-MCNC: 20 MG/DL (ref 9–23)
BURR CELLS BLD QL SMEAR: (no result)
CASTS URNS QL MICRO: (no result)
CHLORIDE SERPL-SCNC: 98 MMOL/L (ref 98–107)
CHLORIDE SERPL-SCNC: 99 MMOL/L (ref 98–107)
CHOLEST SERPL-MCNC: 183 MG/DL (ref ?–200)
COLOR UR: YELLOW
CRYSTALS URNS MICRO: (no result)
DIFFERENTIAL COMMENT: (no result)
DOHLE BOD BLD QL SMEAR: (no result)
EOSINOPHIL # BLD AUTO: (no result) % (ref 1–4)
EOSINOPHIL # BLD AUTO: (no result) 10*3/UL (ref 0–0.4)
ERYTHROCYTE [DISTWIDTH] IN BLOOD BY AUTOMATED COUNT: 14 % (ref 0–14.5)
GLUCOSE UR QL: (no result)
HCT VFR BLD AUTO: 48.5 % (ref 37–47)
HGB UR QL STRIP: NEGATIVE
HOWELL-JOLLY BOD BLD QL SMEAR: (no result)
KETONES UR QL STRIP: NEGATIVE
LDLC SERPL DIRECT ASSAY-MCNC: 103 MG/DL (ref 9–159)
LEUKOCYTE ESTERASE UR QL STRIP: NEGATIVE
MACROCYTES BLD QL SMEAR: (no result)
MANUAL DIFFERENTIAL PERFORMED BLD QL: YES
MCH RBC QN AUTO: 28.1 PG (ref 27–31)
MCHC RBC AUTO-ENTMCNC: 32 G/DL (ref 33–37)
MCV RBC AUTO: 88 FL (ref 81–99)
MICROCYTES BLD QL SMEAR: (no result)
MONOCYTES # BLD AUTO: (no result) 10*3/UL (ref 0.1–1)
MONOCYTES NFR BLD MANUAL: (no result) % (ref 3–9)
MUCOUS THREADS URNS QL MICRO: (no result)
NEUT #: (no result) 10*3/UL (ref 2.3–7.9)
NEUTROPHILS NFR BLD AUTO: (no result) % (ref 47–73)
NITRITE UR QL STRIP: NEGATIVE
NRBC BLD QL AUTO: 0 % (ref 0–0)
OVALOCYTES BLD QL SMEAR: (no result)
PH UR STRIP: 6.5 [PH] (ref 4.5–8)
PLASMA CELLS # BLD MANUAL: (no result) % (ref 0–0)
PLATELET # BLD AUTO: 167 10*3/UL (ref 130–400)
PLATELET SUFFICIENCY: NORMAL
PMV BLD AUTO: 13.2 FL (ref 9.6–12.3)
POLYCHROMASIA BLD QL SMEAR: (no result)
POTASSIUM SERPL-SCNC: 3.6 MMOL/L (ref 3.4–5.1)
POTASSIUM SERPL-SCNC: 3.6 MMOL/L (ref 3.4–5.1)
PROT SERPL-MCNC: 6.7 GM/DL (ref 6–8)
RBC # BLD AUTO: 5.51 10*6/UL (ref 4.1–5.1)
RBC #/AREA URNS HPF: (no result) RBC/HPF (ref 0–2)
RBC MORPH BLD: NORMAL
ROULEAUX BLD QL SMEAR: (no result)
SCHISTOCYTES BLD QL SMEAR: (no result)
SP GR UR: 1.02 (ref 1–1.03)
SPHEROCYTES BLD QL SMEAR: (no result)
STOMATOCYTES BLD QL SMEAR: (no result)
T VAGINALIS URNS QL MICRO: (no result)
T4 FREE SERPL-MCNC: 1.15 NG/DL (ref 0.89–1.76)
TARGETS BLD QL SMEAR: (no result)
TOTAL CELLS COUNTED: 100 #CELLS
TOXIC GRANULES BLD QL SMEAR: (no result)
TRIGL SERPL-MCNC: 150 MG/DL (ref ?–150)
URATE SERPL-MCNC: 4.2 MG/DL (ref 3.1–7.8)
UROBILINOGEN UR STRIP-MCNC: 0.2 E.U./DL (ref 0–1)
VACUOLATION OF NEUTROPHILS: (no result)
VARIANT LYMPHS NFR BLD MANUAL: (no result) % (ref 0–0)
WBC #/AREA URNS HPF: (no result) WBC/HPF (ref 0–5)
WBC MORPH BLD: (no result)
WBC NRBC COR # BLD AUTO: 6.6 10*3/UL (ref 4.8–10.8)
YEAST #/AREA URNS HPF: (no result) /[HPF]

## 2025-07-30 ENCOUNTER — HOSPITAL ENCOUNTER (OUTPATIENT)
Dept: HOSPITAL 83 - CARD | Age: 70
Discharge: HOME | End: 2025-07-30
Attending: STUDENT IN AN ORGANIZED HEALTH CARE EDUCATION/TRAINING PROGRAM
Payer: COMMERCIAL

## 2025-07-30 DIAGNOSIS — I08.0: Primary | ICD-10-CM

## 2025-07-30 DIAGNOSIS — I25.10: ICD-10-CM

## 2025-07-30 DIAGNOSIS — R06.02: ICD-10-CM
